# Patient Record
Sex: FEMALE | Race: WHITE | NOT HISPANIC OR LATINO | URBAN - METROPOLITAN AREA
[De-identification: names, ages, dates, MRNs, and addresses within clinical notes are randomized per-mention and may not be internally consistent; named-entity substitution may affect disease eponyms.]

---

## 2017-03-09 ENCOUNTER — OUTPATIENT (OUTPATIENT)
Dept: OUTPATIENT SERVICES | Facility: HOSPITAL | Age: 57
LOS: 1 days | Discharge: HOME | End: 2017-03-09

## 2017-03-09 ENCOUNTER — APPOINTMENT (OUTPATIENT)
Dept: HEMATOLOGY ONCOLOGY | Facility: CLINIC | Age: 57
End: 2017-03-09

## 2017-03-09 VITALS
DIASTOLIC BLOOD PRESSURE: 80 MMHG | HEIGHT: 60 IN | HEART RATE: 68 BPM | TEMPERATURE: 99.7 F | RESPIRATION RATE: 14 BRPM | BODY MASS INDEX: 35.34 KG/M2 | WEIGHT: 180 LBS | SYSTOLIC BLOOD PRESSURE: 169 MMHG

## 2017-06-06 ENCOUNTER — RX RENEWAL (OUTPATIENT)
Age: 57
End: 2017-06-06

## 2017-06-12 ENCOUNTER — APPOINTMENT (OUTPATIENT)
Dept: BREAST CENTER | Facility: CLINIC | Age: 57
End: 2017-06-12

## 2017-06-12 VITALS
SYSTOLIC BLOOD PRESSURE: 122 MMHG | WEIGHT: 178 LBS | HEIGHT: 60 IN | BODY MASS INDEX: 34.95 KG/M2 | DIASTOLIC BLOOD PRESSURE: 80 MMHG

## 2017-06-27 DIAGNOSIS — C50.411 MALIGNANT NEOPLASM OF UPPER-OUTER QUADRANT OF RIGHT FEMALE BREAST: ICD-10-CM

## 2017-09-21 ENCOUNTER — OUTPATIENT (OUTPATIENT)
Dept: OUTPATIENT SERVICES | Facility: HOSPITAL | Age: 57
LOS: 1 days | Discharge: HOME | End: 2017-09-21

## 2017-09-21 ENCOUNTER — APPOINTMENT (OUTPATIENT)
Dept: HEMATOLOGY ONCOLOGY | Facility: CLINIC | Age: 57
End: 2017-09-21

## 2017-09-21 VITALS
BODY MASS INDEX: 33.99 KG/M2 | WEIGHT: 180 LBS | DIASTOLIC BLOOD PRESSURE: 86 MMHG | TEMPERATURE: 99.7 F | HEIGHT: 61 IN | HEART RATE: 76 BPM | SYSTOLIC BLOOD PRESSURE: 175 MMHG | RESPIRATION RATE: 14 BRPM

## 2017-09-22 DIAGNOSIS — N83.202 UNSPECIFIED OVARIAN CYST, LEFT SIDE: ICD-10-CM

## 2017-09-22 DIAGNOSIS — C50.411 MALIGNANT NEOPLASM OF UPPER-OUTER QUADRANT OF RIGHT FEMALE BREAST: ICD-10-CM

## 2017-09-22 DIAGNOSIS — N83.201 UNSPECIFIED OVARIAN CYST, RIGHT SIDE: ICD-10-CM

## 2017-12-11 ENCOUNTER — APPOINTMENT (OUTPATIENT)
Dept: BREAST CENTER | Facility: CLINIC | Age: 57
End: 2017-12-11

## 2018-02-20 ENCOUNTER — APPOINTMENT (OUTPATIENT)
Dept: BREAST CENTER | Facility: CLINIC | Age: 58
End: 2018-02-20
Payer: COMMERCIAL

## 2018-03-01 ENCOUNTER — APPOINTMENT (OUTPATIENT)
Dept: BREAST CENTER | Facility: CLINIC | Age: 58
End: 2018-03-01
Payer: COMMERCIAL

## 2018-04-02 ENCOUNTER — APPOINTMENT (OUTPATIENT)
Dept: HEMATOLOGY ONCOLOGY | Facility: CLINIC | Age: 58
End: 2018-04-02

## 2018-04-02 ENCOUNTER — OUTPATIENT (OUTPATIENT)
Dept: OUTPATIENT SERVICES | Facility: HOSPITAL | Age: 58
LOS: 1 days | Discharge: HOME | End: 2018-04-02

## 2018-04-02 VITALS
HEIGHT: 61 IN | HEART RATE: 94 BPM | WEIGHT: 185 LBS | RESPIRATION RATE: 14 BRPM | DIASTOLIC BLOOD PRESSURE: 80 MMHG | TEMPERATURE: 98.7 F | BODY MASS INDEX: 34.93 KG/M2 | SYSTOLIC BLOOD PRESSURE: 167 MMHG

## 2018-04-02 DIAGNOSIS — N83.9 NONINFLAMMATORY DISORDER OF OVARY, FALLOPIAN TUBE AND BROAD LIGAMENT, UNSPECIFIED: ICD-10-CM

## 2018-04-03 PROBLEM — N83.9 OVARIAN MASS: Status: ACTIVE | Noted: 2018-04-03

## 2018-04-04 DIAGNOSIS — C50.411 MALIGNANT NEOPLASM OF UPPER-OUTER QUADRANT OF RIGHT FEMALE BREAST: ICD-10-CM

## 2018-04-05 ENCOUNTER — APPOINTMENT (OUTPATIENT)
Dept: BREAST CENTER | Facility: CLINIC | Age: 58
End: 2018-04-05
Payer: COMMERCIAL

## 2018-04-05 VITALS
HEIGHT: 61 IN | BODY MASS INDEX: 34.93 KG/M2 | WEIGHT: 185 LBS | DIASTOLIC BLOOD PRESSURE: 98 MMHG | OXYGEN SATURATION: 98 % | HEART RATE: 78 BPM | SYSTOLIC BLOOD PRESSURE: 142 MMHG

## 2018-04-05 PROCEDURE — 99213 OFFICE O/P EST LOW 20 MIN: CPT

## 2018-05-04 ENCOUNTER — MOBILE ON CALL (OUTPATIENT)
Age: 58
End: 2018-05-04

## 2018-06-01 ENCOUNTER — OUTPATIENT (OUTPATIENT)
Dept: OUTPATIENT SERVICES | Facility: HOSPITAL | Age: 58
LOS: 1 days | Discharge: HOME | End: 2018-06-01

## 2018-06-01 ENCOUNTER — APPOINTMENT (OUTPATIENT)
Dept: GYNECOLOGIC ONCOLOGY | Facility: CLINIC | Age: 58
End: 2018-06-01

## 2018-06-01 VITALS
BODY MASS INDEX: 35.5 KG/M2 | DIASTOLIC BLOOD PRESSURE: 91 MMHG | SYSTOLIC BLOOD PRESSURE: 151 MMHG | TEMPERATURE: 97.6 F | RESPIRATION RATE: 14 BRPM | HEIGHT: 61 IN | WEIGHT: 188 LBS | HEART RATE: 80 BPM

## 2018-06-07 ENCOUNTER — OUTPATIENT (OUTPATIENT)
Dept: OUTPATIENT SERVICES | Facility: HOSPITAL | Age: 58
LOS: 1 days | Discharge: HOME | End: 2018-06-07

## 2018-06-07 VITALS
RESPIRATION RATE: 17 BRPM | SYSTOLIC BLOOD PRESSURE: 149 MMHG | OXYGEN SATURATION: 100 % | DIASTOLIC BLOOD PRESSURE: 75 MMHG | WEIGHT: 185.19 LBS | TEMPERATURE: 97 F | HEART RATE: 80 BPM

## 2018-06-07 DIAGNOSIS — Z01.818 ENCOUNTER FOR OTHER PREPROCEDURAL EXAMINATION: ICD-10-CM

## 2018-06-07 DIAGNOSIS — Z98.890 OTHER SPECIFIED POSTPROCEDURAL STATES: Chronic | ICD-10-CM

## 2018-06-07 LAB
ALBUMIN SERPL ELPH-MCNC: 4.4 G/DL — SIGNIFICANT CHANGE UP (ref 3.5–5.2)
ALP SERPL-CCNC: 73 U/L — SIGNIFICANT CHANGE UP (ref 30–115)
ALT FLD-CCNC: 20 U/L — SIGNIFICANT CHANGE UP (ref 0–41)
ANION GAP SERPL CALC-SCNC: 15 MMOL/L — HIGH (ref 7–14)
APPEARANCE UR: (no result)
APTT BLD: 34.3 SEC — SIGNIFICANT CHANGE UP (ref 27–39.2)
AST SERPL-CCNC: 19 U/L — SIGNIFICANT CHANGE UP (ref 0–41)
BACTERIA # UR AUTO: (no result) /HPF
BASOPHILS # BLD AUTO: 0.05 K/UL — SIGNIFICANT CHANGE UP (ref 0–0.2)
BASOPHILS NFR BLD AUTO: 0.6 % — SIGNIFICANT CHANGE UP (ref 0–1)
BILIRUB SERPL-MCNC: <0.2 MG/DL — SIGNIFICANT CHANGE UP (ref 0.2–1.2)
BILIRUB UR-MCNC: NEGATIVE — SIGNIFICANT CHANGE UP
BLD GP AB SCN SERPL QL: SIGNIFICANT CHANGE UP
BUN SERPL-MCNC: 20 MG/DL — SIGNIFICANT CHANGE UP (ref 10–20)
CALCIUM SERPL-MCNC: 9.8 MG/DL — SIGNIFICANT CHANGE UP (ref 8.5–10.1)
CHLORIDE SERPL-SCNC: 102 MMOL/L — SIGNIFICANT CHANGE UP (ref 98–110)
CO2 SERPL-SCNC: 26 MMOL/L — SIGNIFICANT CHANGE UP (ref 17–32)
COLOR SPEC: YELLOW — SIGNIFICANT CHANGE UP
CREAT SERPL-MCNC: 1 MG/DL — SIGNIFICANT CHANGE UP (ref 0.7–1.5)
DIFF PNL FLD: NEGATIVE — SIGNIFICANT CHANGE UP
EOSINOPHIL # BLD AUTO: 0.17 K/UL — SIGNIFICANT CHANGE UP (ref 0–0.7)
EOSINOPHIL NFR BLD AUTO: 2.2 % — SIGNIFICANT CHANGE UP (ref 0–8)
GLUCOSE SERPL-MCNC: 109 MG/DL — HIGH (ref 70–99)
GLUCOSE UR QL: NEGATIVE MG/DL — SIGNIFICANT CHANGE UP
HCT VFR BLD CALC: 43.2 % — SIGNIFICANT CHANGE UP (ref 37–47)
HGB BLD-MCNC: 14 G/DL — SIGNIFICANT CHANGE UP (ref 12–16)
IMM GRANULOCYTES NFR BLD AUTO: 0.1 % — SIGNIFICANT CHANGE UP (ref 0.1–0.3)
INR BLD: 0.98 RATIO — SIGNIFICANT CHANGE UP (ref 0.65–1.3)
KETONES UR-MCNC: NEGATIVE — SIGNIFICANT CHANGE UP
LEUKOCYTE ESTERASE UR-ACNC: NEGATIVE — SIGNIFICANT CHANGE UP
LYMPHOCYTES # BLD AUTO: 1.55 K/UL — SIGNIFICANT CHANGE UP (ref 1.2–3.4)
LYMPHOCYTES # BLD AUTO: 19.8 % — LOW (ref 20.5–51.1)
MCHC RBC-ENTMCNC: 28.6 PG — SIGNIFICANT CHANGE UP (ref 27–31)
MCHC RBC-ENTMCNC: 32.4 G/DL — SIGNIFICANT CHANGE UP (ref 32–37)
MCV RBC AUTO: 88.3 FL — SIGNIFICANT CHANGE UP (ref 81–99)
MONOCYTES # BLD AUTO: 0.6 K/UL — SIGNIFICANT CHANGE UP (ref 0.1–0.6)
MONOCYTES NFR BLD AUTO: 7.7 % — SIGNIFICANT CHANGE UP (ref 1.7–9.3)
NEUTROPHILS # BLD AUTO: 5.44 K/UL — SIGNIFICANT CHANGE UP (ref 1.4–6.5)
NEUTROPHILS NFR BLD AUTO: 69.6 % — SIGNIFICANT CHANGE UP (ref 42.2–75.2)
NITRITE UR-MCNC: NEGATIVE — SIGNIFICANT CHANGE UP
NRBC # BLD: 0 /100 WBCS — SIGNIFICANT CHANGE UP (ref 0–0)
PH UR: 6 — SIGNIFICANT CHANGE UP (ref 5–8)
PLATELET # BLD AUTO: 253 K/UL — SIGNIFICANT CHANGE UP (ref 130–400)
POTASSIUM SERPL-MCNC: 4.4 MMOL/L — SIGNIFICANT CHANGE UP (ref 3.5–5)
POTASSIUM SERPL-SCNC: 4.4 MMOL/L — SIGNIFICANT CHANGE UP (ref 3.5–5)
PROT SERPL-MCNC: 7.3 G/DL — SIGNIFICANT CHANGE UP (ref 6–8)
PROT UR-MCNC: NEGATIVE MG/DL — SIGNIFICANT CHANGE UP
PROTHROM AB SERPL-ACNC: 10.6 SEC — SIGNIFICANT CHANGE UP (ref 9.95–12.87)
RBC # BLD: 4.89 M/UL — SIGNIFICANT CHANGE UP (ref 4.2–5.4)
RBC # FLD: 13.2 % — SIGNIFICANT CHANGE UP (ref 11.5–14.5)
SODIUM SERPL-SCNC: 143 MMOL/L — SIGNIFICANT CHANGE UP (ref 135–146)
SP GR SPEC: 1.02 — SIGNIFICANT CHANGE UP (ref 1.01–1.03)
TYPE + AB SCN PNL BLD: SIGNIFICANT CHANGE UP
UROBILINOGEN FLD QL: 0.2 MG/DL — SIGNIFICANT CHANGE UP (ref 0.2–0.2)
WBC # BLD: 7.82 K/UL — SIGNIFICANT CHANGE UP (ref 4.8–10.8)
WBC # FLD AUTO: 7.82 K/UL — SIGNIFICANT CHANGE UP (ref 4.8–10.8)
WBC UR QL: SIGNIFICANT CHANGE UP /HPF

## 2018-06-07 NOTE — H&P PST ADULT - HISTORY OF PRESENT ILLNESS
PATIENT DENIES CHEST PAIN, SHORTNESS OF BREATH, PALPITATIONS, COUGHING, FEVER, DYSURIA.  CAN WALK UP 3-4 FLIGHTS OF STEPS WITHOUT SOB.

## 2018-06-07 NOTE — H&P PST ADULT - REASON FOR ADMISSION
58 Y/O FEMALE HERE FOR PRE-ADMISSION SURGICAL TESTING. PATIENT REPORTS SHE HAS A H/O RIGHT BREAST CANCER 11/2014. PATIENT HAS A LONG H/O OVARIAN CYSTS. PRIOR W/U REVEALED THAT THE OVARIAN CYSTS ARE GETTING PROGRESSIVELY BIGGER. TUMOR MARKERS WERE NEGATIVE. NOW FOR SCHEDULED PROCEDURE FOR PROPHYLACTIC REASONS.

## 2018-06-07 NOTE — H&P PST ADULT - PMH
Anxiety    Breast cancer  RIGHT BREAST CA, S/P LUMPECTOMY AND RADIATION. LAST DOSE 3/15  Ovarian cyst  B/L

## 2018-06-08 DIAGNOSIS — C54.1 MALIGNANT NEOPLASM OF ENDOMETRIUM: ICD-10-CM

## 2018-06-15 ENCOUNTER — RESULT REVIEW (OUTPATIENT)
Age: 58
End: 2018-06-15

## 2018-06-15 ENCOUNTER — OUTPATIENT (OUTPATIENT)
Dept: OUTPATIENT SERVICES | Facility: HOSPITAL | Age: 58
LOS: 1 days | Discharge: HOME | End: 2018-06-15

## 2018-06-15 VITALS
HEART RATE: 69 BPM | DIASTOLIC BLOOD PRESSURE: 87 MMHG | TEMPERATURE: 98 F | RESPIRATION RATE: 18 BRPM | SYSTOLIC BLOOD PRESSURE: 166 MMHG | WEIGHT: 188.05 LBS | HEIGHT: 61 IN

## 2018-06-15 VITALS — HEART RATE: 86 BPM | RESPIRATION RATE: 20 BRPM | DIASTOLIC BLOOD PRESSURE: 71 MMHG | SYSTOLIC BLOOD PRESSURE: 117 MMHG

## 2018-06-15 DIAGNOSIS — Z98.890 OTHER SPECIFIED POSTPROCEDURAL STATES: Chronic | ICD-10-CM

## 2018-06-15 LAB — ABO RH CONFIRMATION: SIGNIFICANT CHANGE UP

## 2018-06-15 RX ORDER — MEPERIDINE HYDROCHLORIDE 50 MG/ML
12.5 INJECTION INTRAMUSCULAR; INTRAVENOUS; SUBCUTANEOUS
Qty: 0 | Refills: 0 | Status: DISCONTINUED | OUTPATIENT
Start: 2018-06-15 | End: 2018-06-15

## 2018-06-15 RX ORDER — HYDROMORPHONE HYDROCHLORIDE 2 MG/ML
0.5 INJECTION INTRAMUSCULAR; INTRAVENOUS; SUBCUTANEOUS
Qty: 0 | Refills: 0 | Status: DISCONTINUED | OUTPATIENT
Start: 2018-06-15 | End: 2018-06-15

## 2018-06-15 RX ORDER — IBUPROFEN 200 MG
1 TABLET ORAL
Qty: 120 | Refills: 1
Start: 2018-06-15 | End: 2018-08-13

## 2018-06-15 RX ORDER — OXYCODONE AND ACETAMINOPHEN 5; 325 MG/1; MG/1
1 TABLET ORAL
Qty: 20 | Refills: 0
Start: 2018-06-15

## 2018-06-15 RX ORDER — ONDANSETRON 8 MG/1
4 TABLET, FILM COATED ORAL ONCE
Qty: 0 | Refills: 0 | Status: DISCONTINUED | OUTPATIENT
Start: 2018-06-15 | End: 2018-06-15

## 2018-06-15 RX ORDER — HEPARIN SODIUM 5000 [USP'U]/ML
5000 INJECTION INTRAVENOUS; SUBCUTANEOUS ONCE
Qty: 0 | Refills: 0 | Status: DISCONTINUED | OUTPATIENT
Start: 2018-06-15 | End: 2018-06-15

## 2018-06-15 RX ORDER — KETOROLAC TROMETHAMINE 30 MG/ML
15 SYRINGE (ML) INJECTION EVERY 6 HOURS
Qty: 0 | Refills: 0 | Status: DISCONTINUED | OUTPATIENT
Start: 2018-06-15 | End: 2018-06-15

## 2018-06-15 RX ORDER — SODIUM CHLORIDE 9 MG/ML
1000 INJECTION, SOLUTION INTRAVENOUS
Qty: 0 | Refills: 0 | Status: DISCONTINUED | OUTPATIENT
Start: 2018-06-15 | End: 2018-06-15

## 2018-06-15 RX ORDER — HYDROMORPHONE HYDROCHLORIDE 2 MG/ML
1 INJECTION INTRAMUSCULAR; INTRAVENOUS; SUBCUTANEOUS
Qty: 0 | Refills: 0 | Status: DISCONTINUED | OUTPATIENT
Start: 2018-06-15 | End: 2018-06-15

## 2018-06-15 RX ADMIN — SODIUM CHLORIDE 100 MILLILITER(S): 9 INJECTION, SOLUTION INTRAVENOUS at 14:47

## 2018-06-15 RX ADMIN — Medication 15 MILLIGRAM(S): at 14:47

## 2018-06-15 RX ADMIN — Medication 15 MILLIGRAM(S): at 14:07

## 2018-06-15 NOTE — ASU DISCHARGE PLAN (ADULT/PEDIATRIC). - NOTIFY
GYN Fever>100.4/Inability to Tolerate Liquids or Foods/Unable to Urinate/Pain not relieved by Medications/Increased Irritability or Sluggishness/Persistent Nausea and Vomiting

## 2018-06-15 NOTE — CHART NOTE - NSCHARTNOTEFT_GEN_A_CORE
PACU ANESTHESIA ADMISSION NOTE      Procedure: ovarian cyst  Post op diagnosis:  robotic assisted laproscopic lysis of adhesions,bilateral salphingo oopherectomy    ____  Intubated  TV:______       Rate: ______      FiO2: ______    _x___  Patent Airway    _x___  Full return of protective reflexes    _x___  Full recovery from anesthesia / back to baseline status    Vitals  SPO2:-97% on 3l  HR:-92  RR:-14  B.P:-130/77  TEMp:-98.8    Mental Status:  _x___ Awake   ___x_ Alert   _____ Drowsy   _____ Sedated    Nausea/Vomiting:  _x___  NO       ______Yes,   See Post - Op Orders         Pain Scale (0-10):  __0___    Treatment: _x___ None    __x__ See Post - Op/PCA Orders    Post - Operative Fluids:   ___ Oral   ____x See Post - Op Orders    Plan: Discharge:   __x__Home       ___Floor     _____Critical Care    _____  Other:_________________    Comments:  Report endorsed to RN in pacu  Vitals stable  No anesthesia issues or complications noted.  Discharge to patient to home when criteria met.

## 2018-06-15 NOTE — BRIEF OPERATIVE NOTE - OPERATION/FINDINGS
EUA: narrow introitus, stenotic cervix, otherwise normal vagina and cervix, normal sized uterus  Lap: dense and filmy adhesions between the bowel and uterus and ovaries, lysis of adhesions performed, left cystic ovary suggestive of endometrioma, right cystic ovary with endometrioma, normal tubes and fimbria; normal uterus. Adhesions between the posterior cul de sac and uterus

## 2018-06-15 NOTE — BRIEF OPERATIVE NOTE - PROCEDURE
<<-----Click on this checkbox to enter Procedure Lysis of adhesions  06/15/2018    Active  JANAYU  Laparoscopic robotic assisted procedure  06/15/2018    Active  JANAYU  Bilateral salpingectomy with oophorectomy  06/15/2018    Active  JANAYU

## 2018-06-15 NOTE — BRIEF OPERATIVE NOTE - POST-OP DX
Endometrioma of ovary  06/15/2018    Active  Ryan Cobb  Ovarian cyst  06/15/2018    Active  Ryan Cobb

## 2018-06-18 LAB — NON-GYNECOLOGICAL CYTOLOGY STUDY: SIGNIFICANT CHANGE UP

## 2018-06-19 LAB — SURGICAL PATHOLOGY STUDY: SIGNIFICANT CHANGE UP

## 2018-06-20 DIAGNOSIS — Z85.3 PERSONAL HISTORY OF MALIGNANT NEOPLASM OF BREAST: ICD-10-CM

## 2018-06-20 DIAGNOSIS — N73.6 FEMALE PELVIC PERITONEAL ADHESIONS (POSTINFECTIVE): ICD-10-CM

## 2018-06-20 DIAGNOSIS — Z88.0 ALLERGY STATUS TO PENICILLIN: ICD-10-CM

## 2018-06-20 DIAGNOSIS — N80.1 ENDOMETRIOSIS OF OVARY: ICD-10-CM

## 2018-06-20 DIAGNOSIS — N83.291 OTHER OVARIAN CYST, RIGHT SIDE: ICD-10-CM

## 2018-06-20 DIAGNOSIS — F41.9 ANXIETY DISORDER, UNSPECIFIED: ICD-10-CM

## 2018-07-06 ENCOUNTER — APPOINTMENT (OUTPATIENT)
Dept: GYNECOLOGIC ONCOLOGY | Facility: CLINIC | Age: 58
End: 2018-07-06

## 2018-07-06 ENCOUNTER — OUTPATIENT (OUTPATIENT)
Dept: OUTPATIENT SERVICES | Facility: HOSPITAL | Age: 58
LOS: 1 days | Discharge: HOME | End: 2018-07-06

## 2018-07-06 VITALS
RESPIRATION RATE: 14 BRPM | BODY MASS INDEX: 35.5 KG/M2 | WEIGHT: 188 LBS | SYSTOLIC BLOOD PRESSURE: 150 MMHG | HEART RATE: 69 BPM | DIASTOLIC BLOOD PRESSURE: 80 MMHG | HEIGHT: 61 IN

## 2018-07-06 DIAGNOSIS — Z98.890 OTHER SPECIFIED POSTPROCEDURAL STATES: Chronic | ICD-10-CM

## 2018-09-27 ENCOUNTER — OUTPATIENT (OUTPATIENT)
Dept: OUTPATIENT SERVICES | Facility: HOSPITAL | Age: 58
LOS: 1 days | Discharge: HOME | End: 2018-09-27

## 2018-09-27 ENCOUNTER — APPOINTMENT (OUTPATIENT)
Dept: HEMATOLOGY ONCOLOGY | Facility: CLINIC | Age: 58
End: 2018-09-27

## 2018-09-27 VITALS
BODY MASS INDEX: 31.34 KG/M2 | DIASTOLIC BLOOD PRESSURE: 71 MMHG | SYSTOLIC BLOOD PRESSURE: 158 MMHG | TEMPERATURE: 99.6 F | RESPIRATION RATE: 16 BRPM | HEIGHT: 61 IN | WEIGHT: 166 LBS | HEART RATE: 61 BPM

## 2018-09-27 DIAGNOSIS — Z98.890 OTHER SPECIFIED POSTPROCEDURAL STATES: Chronic | ICD-10-CM

## 2018-09-27 PROBLEM — N83.209 UNSPECIFIED OVARIAN CYST, UNSPECIFIED SIDE: Chronic | Status: ACTIVE | Noted: 2018-06-07

## 2018-09-27 PROBLEM — C50.919 MALIGNANT NEOPLASM OF UNSPECIFIED SITE OF UNSPECIFIED FEMALE BREAST: Chronic | Status: ACTIVE | Noted: 2018-06-07

## 2018-09-27 PROBLEM — F41.9 ANXIETY DISORDER, UNSPECIFIED: Chronic | Status: ACTIVE | Noted: 2018-06-07

## 2018-09-28 DIAGNOSIS — D27.1 BENIGN NEOPLASM OF LEFT OVARY: ICD-10-CM

## 2018-09-28 DIAGNOSIS — C50.411 MALIGNANT NEOPLASM OF UPPER-OUTER QUADRANT OF RIGHT FEMALE BREAST: ICD-10-CM

## 2018-09-28 DIAGNOSIS — D27.0 BENIGN NEOPLASM OF RIGHT OVARY: ICD-10-CM

## 2018-12-06 ENCOUNTER — APPOINTMENT (OUTPATIENT)
Dept: BREAST CENTER | Facility: CLINIC | Age: 58
End: 2018-12-06

## 2019-01-31 ENCOUNTER — APPOINTMENT (OUTPATIENT)
Dept: BREAST CENTER | Facility: CLINIC | Age: 59
End: 2019-01-31

## 2019-03-28 ENCOUNTER — OUTPATIENT (OUTPATIENT)
Dept: OUTPATIENT SERVICES | Facility: HOSPITAL | Age: 59
LOS: 1 days | Discharge: HOME | End: 2019-03-28

## 2019-03-28 ENCOUNTER — APPOINTMENT (OUTPATIENT)
Dept: HEMATOLOGY ONCOLOGY | Facility: CLINIC | Age: 59
End: 2019-03-28

## 2019-03-28 VITALS
RESPIRATION RATE: 16 BRPM | WEIGHT: 139 LBS | TEMPERATURE: 98.2 F | BODY MASS INDEX: 26.24 KG/M2 | HEART RATE: 78 BPM | HEIGHT: 61 IN | SYSTOLIC BLOOD PRESSURE: 133 MMHG | DIASTOLIC BLOOD PRESSURE: 83 MMHG

## 2019-03-28 DIAGNOSIS — Z98.890 OTHER SPECIFIED POSTPROCEDURAL STATES: Chronic | ICD-10-CM

## 2019-03-29 DIAGNOSIS — C50.411 MALIGNANT NEOPLASM OF UPPER-OUTER QUADRANT OF RIGHT FEMALE BREAST: ICD-10-CM

## 2019-04-07 NOTE — ASSESSMENT
[FreeTextEntry1] : 56 year old female with history of stage IA hormone receptor positive, HER2 negative infiltrating ductal carcinoma of the right breast, status post lumpectomy and radiation, currently on Arimidex without any evidence of recurrence. \par Benign lesions on both ovaries\par \par Plan:\par Continue with Arimidex. Continue Ca and Vit D\par Mammogram in 11/2018 was neg.\par DEXA in 05/2017 showed osteopenia. \par \par RTC 6 months.\par \par \par

## 2019-04-07 NOTE — PHYSICAL EXAM
[Fully active, able to carry on all pre-disease performance without restriction] : Status 0 - Fully active, able to carry on all pre-disease performance without restriction [Normal] : affect appropriate [de-identified] : Right breast shows lumpectomy scars without any palpable abnormalities. Left breast is normal

## 2019-04-07 NOTE — HISTORY OF PRESENT ILLNESS
[de-identified] : The patient returns for a followup visit.  She has history of stage IA hormone receptor positive, HER2 negative infiltrating ductal carcinoma of the right breast, status post lumpectomy and radiation.  Oncotype DX score on the tumor was 15.  She was started on hormonal therapy with Arimidex in 03/2015.  She is tolerating it well.  She is very compliant with her medications.  She does not report any new symptoms.  She follow up with her gynecologist annually. Her mammogram from November 2016 was unremarkable. She also had a bilateral sonogram which was unremarkable also. Her last Dexa scan was in May 2017 and showed osteopenia. She takes calcium and vitamin D regularly.\par \par She is not interested in getting colonoscopy.\par  [de-identified] : AURELIANO DANIELS                         2    Surgical Final Report     Final Diagnosis 1. Left fallopian tube and ovary for frozen section: - Hemorrhagic cyst of ovary with mainly denuded inner surface lining and abundant hemosiderin laden macrophages, consistent with endometrioid cyst. -The remaining ovarian parenchyma showing corpus albicans and a few glandular inclusions. - Segment of fallopian tube without significant pathologic changes.  \par \par 2. Right fallopian tube and ovary for frozen section: - Hemorrhagic cyst of ovary with mainly denuded inner surface lining and abundant hemosiderin laden macrophages, consistent with endometrioid cyst. - The remaining ovarian parenchyma showing corpus albicans and a few glandular inclusions with microcalcification. -Adjacent benign simple cysts also present. - Segment of fallopian tube without significant pathological change. [de-identified] : No new complaints. Had labs on 8/7/17 which were reviewed and were normal.\par She had pelvic USG findings showed decrease in size of right ovarian cyst and decrease in size of complex left mass. Report has been reviewed with patient in detail. She denies any vaginal bleeding and has been seen by her GYN on a regular basis.\par She is compliant with her meds\par \par 4/2/18\par Patient here for follow up, feels well has no complaints, denies any shortness of breath, chest pain, abdominal pain, nausea, vomiting, vaginal bleeding, or change in bowel or urinary habits. Patient had a 3D mammogram and US done 11/16/17 which were BIRADS-1. Patient also had a follow up US which demonstrated a mild increase in the bilobed Mild right ovarian cystic lesion\par \par 9/27/18:\par S/p b/l oophorectomy in June 2018. \par On Arimidex since 03/2015.\par Tolerating well. Denies fever, nausea, vomiting, chest pain, SOB, abdominal pain, bowel and bladder problems.\par Due for mammogram in 11/2018. \par On calcium and Vit D supplementation. DEXA in 05/2017 showed osteopenia. \par \par 3/28/19\par pt is here for follow up.\par No new complaints\par On Arimidex since 03/2015.\par Tolerating well. Denies fever, nausea, vomiting, chest pain, SOB, abdominal pain, bowel and bladder problems.

## 2019-09-23 ENCOUNTER — OUTPATIENT (OUTPATIENT)
Dept: OUTPATIENT SERVICES | Facility: HOSPITAL | Age: 59
LOS: 1 days | Discharge: HOME | End: 2019-09-23

## 2019-09-23 ENCOUNTER — APPOINTMENT (OUTPATIENT)
Dept: HEMATOLOGY ONCOLOGY | Facility: CLINIC | Age: 59
End: 2019-09-23
Payer: COMMERCIAL

## 2019-09-23 VITALS
TEMPERATURE: 96.3 F | HEART RATE: 72 BPM | BODY MASS INDEX: 24.92 KG/M2 | HEIGHT: 61 IN | WEIGHT: 132 LBS | SYSTOLIC BLOOD PRESSURE: 158 MMHG | DIASTOLIC BLOOD PRESSURE: 89 MMHG

## 2019-09-23 DIAGNOSIS — Z98.890 OTHER SPECIFIED POSTPROCEDURAL STATES: Chronic | ICD-10-CM

## 2019-09-23 DIAGNOSIS — Z79.811 LONG TERM (CURRENT) USE OF AROMATASE INHIBITORS: ICD-10-CM

## 2019-09-23 PROCEDURE — 99214 OFFICE O/P EST MOD 30 MIN: CPT

## 2019-09-23 NOTE — ASSESSMENT
[FreeTextEntry1] : 56 year old female with history of stage IA hormone receptor positive, HER2 negative infiltrating ductal carcinoma of the right breast, status post lumpectomy and radiation, currently on Arimidex without any evidence of recurrence.  Benign lesions on both ovaries.\par \par Plan:\par - 7/30/2019 labs from primary care physician reviewed.\par - Continue with Arimidex. Patient will complete 5 years in 3/2020.  Continue Calcium and Vitamin D supplementation.\par - The side effects from such treatment were discussed in detail, including but not limited to hot flashes,weight gain, hair thinning, arthralgia, myalgia, bone loss, increased risk of osteoporotic fractures and thromboembolism.\par She will take Ca + VIt D daily while on AI.  Her compliance and any side effects from such treatment were assessed at today's visit.\par - Mammogram in 11/2018 was negative.  Patient has an upcoming appointment for a repeat mammogram in 11/2019.\par - DEXA in 5/2019 showed osteopenia in lumbar spine and femoral neck.  Patient was offered Boniva but declined.  States that she walks 2 miles daily and takes calcium and vitamin D.  Patient counseled to add weight-bearing exercise.\par - Patient is UTD with gynecologic care but not UTD with colonoscopy.  Educated on importance of colonoscopy.\par \par Follow up in 6 months.\par \par Patient was seen and examined with Dr. Byers, who agreed with the above plan of care.\par \par

## 2019-09-23 NOTE — RESULTS/DATA
[FreeTextEntry1] : DEXA scan 11/2019: Osteopenia of L spine and femoral neck\par \par Mammogram 11/2018: BI-RADS 1.

## 2019-09-23 NOTE — CONSULT LETTER
[Dear  ___] : Dear  [unfilled], [Courtesy Letter:] : I had the pleasure of seeing your patient, [unfilled], in my office today. [Please see my note below.] : Please see my note below. [Sincerely,] : Sincerely, [FreeTextEntry3] : RAZA Byers

## 2019-09-23 NOTE — HISTORY OF PRESENT ILLNESS
[de-identified] : The patient returns for a followup visit.  She has history of stage IA hormone receptor positive, HER2 negative infiltrating ductal carcinoma of the right breast, status post lumpectomy and radiation.  Oncotype DX score on the tumor was 15.  She was started on hormonal therapy with Arimidex in 03/2015.  She is tolerating it well.  She is very compliant with her medications.  She does not report any new symptoms.  She follow up with her gynecologist annually. Her mammogram from November 2016 was unremarkable. She also had a bilateral sonogram which was unremarkable also. Her last Dexa scan was in May 2017 and showed osteopenia. She takes calcium and vitamin D regularly.\par \par She is not interested in getting colonoscopy.\par  [de-identified] : AURELIANO DANIELS                         2    Surgical Final Report     Final Diagnosis 1. Left fallopian tube and ovary for frozen section: - Hemorrhagic cyst of ovary with mainly denuded inner surface lining and abundant hemosiderin laden macrophages, consistent with endometrioid cyst. -The remaining ovarian parenchyma showing corpus albicans and a few glandular inclusions. - Segment of fallopian tube without significant pathologic changes.  \par \par 2. Right fallopian tube and ovary for frozen section: - Hemorrhagic cyst of ovary with mainly denuded inner surface lining and abundant hemosiderin laden macrophages, consistent with endometrioid cyst. - The remaining ovarian parenchyma showing corpus albicans and a few glandular inclusions with microcalcification. -Adjacent benign simple cysts also present. - Segment of fallopian tube without significant pathological change. [de-identified] : No new complaints. Had labs on 8/7/17 which were reviewed and were normal.\par She had pelvic USG findings showed decrease in size of right ovarian cyst and decrease in size of complex left mass. Report has been reviewed with patient in detail. She denies any vaginal bleeding and has been seen by her GYN on a regular basis.\par She is compliant with her meds\par \par 4/2/18\par Patient here for follow up, feels well has no complaints, denies any shortness of breath, chest pain, abdominal pain, nausea, vomiting, vaginal bleeding, or change in bowel or urinary habits. Patient had a 3D mammogram and US done 11/16/17 which were BIRADS-1. Patient also had a follow up US which demonstrated a mild increase in the bilobed Mild right ovarian cystic lesion\par \par 9/27/18:\par S/p b/l oophorectomy in June 2018. \par On Arimidex since 03/2015.\par Tolerating well. Denies fever, nausea, vomiting, chest pain, SOB, abdominal pain, bowel and bladder problems.\par Due for mammogram in 11/2018. \par On calcium and Vit D supplementation. DEXA in 05/2017 showed osteopenia. \par \par 3/28/19\par pt is here for follow up.\par No new complaints\par On Arimidex since 03/2015.\par Tolerating well. Denies fever, nausea, vomiting, chest pain, SOB, abdominal pain, bowel and bladder problems.\par \par 9/23/2019\par Patient presents for follow up.  She has no complaints.  Was found to have osteopenia on 5/2017 and 5/2019 DEXA scans and was offered Boniva but declined.  States that she walks two miles daily and takes calcium and vitamin D supplementation, so she does not believe it is necessary.  Is compliant with arimidex and is looking forward to discontinuing it after 5 years.  Denies anorexia, weight loss, breast masses, nipple discharge, axillary LAD.  Already has appointment for mammogram in 11/2019.  Is UTD with gynecologic care but not with colonoscopy.

## 2019-09-23 NOTE — PHYSICAL EXAM
[Fully active, able to carry on all pre-disease performance without restriction] : Status 0 - Fully active, able to carry on all pre-disease performance without restriction [Normal] : normal spine exam without palpable tenderness, no kyphosis or scoliosis [de-identified] : Right breast shows lumpectomy scars without any palpable abnormalities. Left breast is normal. [de-identified] : well-appearing

## 2019-09-24 DIAGNOSIS — C50.911 MALIGNANT NEOPLASM OF UNSPECIFIED SITE OF RIGHT FEMALE BREAST: ICD-10-CM

## 2019-09-24 DIAGNOSIS — M85.80 OTHER SPECIFIED DISORDERS OF BONE DENSITY AND STRUCTURE, UNSPECIFIED SITE: ICD-10-CM

## 2019-09-24 DIAGNOSIS — Z79.811 LONG TERM (CURRENT) USE OF AROMATASE INHIBITORS: ICD-10-CM

## 2020-01-21 ENCOUNTER — APPOINTMENT (OUTPATIENT)
Dept: BREAST CENTER | Facility: CLINIC | Age: 60
End: 2020-01-21
Payer: COMMERCIAL

## 2020-01-21 VITALS — BODY MASS INDEX: 25.49 KG/M2 | WEIGHT: 135 LBS | HEIGHT: 61 IN

## 2020-01-21 PROCEDURE — 99212 OFFICE O/P EST SF 10 MIN: CPT

## 2020-04-23 ENCOUNTER — APPOINTMENT (OUTPATIENT)
Dept: HEMATOLOGY ONCOLOGY | Facility: CLINIC | Age: 60
End: 2020-04-23

## 2020-09-03 ENCOUNTER — APPOINTMENT (OUTPATIENT)
Dept: HEMATOLOGY ONCOLOGY | Facility: CLINIC | Age: 60
End: 2020-09-03
Payer: COMMERCIAL

## 2020-09-03 ENCOUNTER — OUTPATIENT (OUTPATIENT)
Dept: OUTPATIENT SERVICES | Facility: HOSPITAL | Age: 60
LOS: 1 days | Discharge: HOME | End: 2020-09-03

## 2020-09-03 DIAGNOSIS — Z98.890 OTHER SPECIFIED POSTPROCEDURAL STATES: Chronic | ICD-10-CM

## 2020-09-03 DIAGNOSIS — M85.80 OTHER SPECIFIED DISORDERS OF BONE DENSITY AND STRUCTURE, UNSPECIFIED SITE: ICD-10-CM

## 2020-09-03 PROCEDURE — 99213 OFFICE O/P EST LOW 20 MIN: CPT

## 2020-09-04 NOTE — PHYSICAL EXAM
[Fully active, able to carry on all pre-disease performance without restriction] : Status 0 - Fully active, able to carry on all pre-disease performance without restriction [Normal] : affect appropriate [de-identified] : well-appearing [de-identified] : Right breast shows lumpectomy scars without any palpable abnormalities. Left breast is normal.

## 2020-09-04 NOTE — RESULTS/DATA
[FreeTextEntry1] : DEXA scan 11/2019: Osteopenia of L spine and femoral neck\par \par Mammogram 11/2019: BI-RADS 1.

## 2020-09-04 NOTE — HISTORY OF PRESENT ILLNESS
[de-identified] : The patient returns for a followup visit.  She has history of stage IA hormone receptor positive, HER2 negative infiltrating ductal carcinoma of the right breast, status post lumpectomy and radiation.  Oncotype DX score on the tumor was 15.  She was started on hormonal therapy with Arimidex in 03/2015.  She is tolerating it well.  She is very compliant with her medications.  She does not report any new symptoms.  She follow up with her gynecologist annually. Her mammogram from November 2016 was unremarkable. She also had a bilateral sonogram which was unremarkable also. Her last Dexa scan was in May 2017 and showed osteopenia. She takes calcium and vitamin D regularly.\par \par She is not interested in getting colonoscopy.\par  [de-identified] : No new complaints. Had labs on 8/7/17 which were reviewed and were normal.\par She had pelvic USG findings showed decrease in size of right ovarian cyst and decrease in size of complex left mass. Report has been reviewed with patient in detail. She denies any vaginal bleeding and has been seen by her GYN on a regular basis.\par She is compliant with her meds\par \par 4/2/18\par Patient here for follow up, feels well has no complaints, denies any shortness of breath, chest pain, abdominal pain, nausea, vomiting, vaginal bleeding, or change in bowel or urinary habits. Patient had a 3D mammogram and US done 11/16/17 which were BIRADS-1. Patient also had a follow up US which demonstrated a mild increase in the bilobed Mild right ovarian cystic lesion\par \par 9/27/18:\par S/p b/l oophorectomy in June 2018. \par On Arimidex since 03/2015.\par Tolerating well. Denies fever, nausea, vomiting, chest pain, SOB, abdominal pain, bowel and bladder problems.\par Due for mammogram in 11/2018. \par On calcium and Vit D supplementation. DEXA in 05/2017 showed osteopenia. \par \par 3/28/19\par pt is here for follow up.\par No new complaints\par On Arimidex since 03/2015.\par Tolerating well. Denies fever, nausea, vomiting, chest pain, SOB, abdominal pain, bowel and bladder problems.\par \par 9/23/2019\par Patient presents for follow up.  She has no complaints.  Was found to have osteopenia on 5/2017 and 5/2019 DEXA scans and was offered Boniva but declined.  States that she walks two miles daily and takes calcium and vitamin D supplementation, so she does not believe it is necessary.  Is compliant with arimidex and is looking forward to discontinuing it after 5 years.  Denies anorexia, weight loss, breast masses, nipple discharge, axillary LAD.  Already has appointment for mammogram in 11/2019.  Is UTD with gynecologic care but not with colonoscopy.\par \par 09/03/2020\par AURELIANO DANIELS a 59 year F is here today for follow up for breast cancer. \par Patient denies any new palpable breast lumps or pain, denies skin changes, denies nipple discharge. Denies bleeding, bone pain. \par She completed 5 years of arimidex in 3/2020. Continues vitamin d and calcium.\par [facility], 11/2019 no evidence of malignancy.  [de-identified] : AURELIANO DANIELS                         2    Surgical Final Report     Final Diagnosis 1. Left fallopian tube and ovary for frozen section: - Hemorrhagic cyst of ovary with mainly denuded inner surface lining and abundant hemosiderin laden macrophages, consistent with endometrioid cyst. -The remaining ovarian parenchyma showing corpus albicans and a few glandular inclusions. - Segment of fallopian tube without significant pathologic changes.  \par \par 2. Right fallopian tube and ovary for frozen section: - Hemorrhagic cyst of ovary with mainly denuded inner surface lining and abundant hemosiderin laden macrophages, consistent with endometrioid cyst. - The remaining ovarian parenchyma showing corpus albicans and a few glandular inclusions with microcalcification. -Adjacent benign simple cysts also present. - Segment of fallopian tube without significant pathological change.

## 2020-09-04 NOTE — ASSESSMENT
[FreeTextEntry1] : 56 year old female with history of stage IA hormone receptor positive, HER2 negative infiltrating ductal carcinoma of the right breast, status post lumpectomy and radiation, currently on Arimidex without any evidence of recurrence. Benign lesions on both ovaries, s/p RA BSO 06/15/2018. Completed 5 years of arimidex 3/2020. \par \par \par Plan:\par - 7/30/2020 labs from primary care physician reviewed.\par - Continue Calcium and Vitamin D supplementation. \par - Mammogram in 11/2019 was negative.  Patient has an upcoming appointment for a repeat mammogram in 11/2020.\par - DEXA in 5/2019 showed osteopenia in lumbar spine and femoral neck.  Patient was offered Boniva but declined.  States that she walks 2 miles daily and takes calcium and vitamin D.  Patient counseled to add weight-bearing exercise.\par - Patient is UTD with gynecologic care but not UTD with colonoscopy, she has cologuard kit at home. Educated on importance of colonoscopy. \par \par Follow up in 1 year.\par \par Patient was seen and examined with Dr. Byers, who agreed with the above plan of care.\par \par

## 2020-09-09 DIAGNOSIS — M85.80 OTHER SPECIFIED DISORDERS OF BONE DENSITY AND STRUCTURE, UNSPECIFIED SITE: ICD-10-CM

## 2020-09-09 DIAGNOSIS — C50.911 MALIGNANT NEOPLASM OF UNSPECIFIED SITE OF RIGHT FEMALE BREAST: ICD-10-CM

## 2021-05-27 ENCOUNTER — APPOINTMENT (OUTPATIENT)
Dept: BREAST CENTER | Facility: CLINIC | Age: 61
End: 2021-05-27
Payer: COMMERCIAL

## 2021-05-27 VITALS
DIASTOLIC BLOOD PRESSURE: 86 MMHG | SYSTOLIC BLOOD PRESSURE: 148 MMHG | HEIGHT: 61 IN | TEMPERATURE: 97.4 F | BODY MASS INDEX: 25.49 KG/M2 | WEIGHT: 135 LBS

## 2021-05-27 PROCEDURE — 99212 OFFICE O/P EST SF 10 MIN: CPT

## 2021-05-27 PROCEDURE — 99072 ADDL SUPL MATRL&STAF TM PHE: CPT

## 2021-05-27 NOTE — DATA REVIEWED
[FreeTextEntry1] : Mammogram 11/23/20: No significant masses, calcifications or other findings are seen in either breast \par Mammo Bi-RADS 1 NEGATIVE \par \par Ultrasound complete bilateral 11/23/20\par No suspicious abnormalities were seen sonographically in either breast \par Ultrasound Bi-RADS 1: NEGATIVE

## 2021-05-27 NOTE — HISTORY OF PRESENT ILLNESS
[FreeTextEntry1] : CC: History of Right breast cancer, 2014. \par \par HPI:\par AURELIANO DANIELS is a 60 year year old female patient who presents for evaluation and for follow up apt.  Patient has history of right breast carcinoma treated in 2014 with lumpectomy and sentinel lymph node biopsy. Surgery was performed by  at [facility], who has since retired. After surgery, she received whole breast irradiation therapy to the right breast. Oncotype DX score was 15. She saw  of medical oncology who recommended no chemotherapy, and prescribed adjuvant ARIMIDEX. \par She completed treatment of 5 years with Arimidex on 3/20. \par \par \par Mammogram 11/23/20: No significant masses, calcifications or other findings are seen in either breast \par Mammo Bi-RADS 1 NEGATIVE \par \par Ultrasound complete bilateral 11/23/20\par No suspicious abnormalities were seen sonographically in either breast \par Ultrasound Bi-RADS 1: NEGATIVE

## 2021-05-27 NOTE — PHYSICAL EXAM
[Normocephalic] : normocephalic [Atraumatic] : atraumatic [EOMI] : extra ocular movement intact [Supple] : supple [Clear to Auscultation Bilat] : clear to auscultation bilaterally [Normal Sinus Rhythm] : normal sinus rhythm [Examined in the supine and seated position] : examined in the supine and seated position [Soft] : abdomen soft [No Edema] : no edema [No Supraclavicular Adenopathy] : no supraclavicular adenopathy [No Cervical Adenopathy] : no cervical adenopathy [No Thyromegaly] : no thyromegaly [Symmetrical] : symmetrical [No dominant masses] : no dominant masses in right breast  [No dominant masses] : no dominant masses left breast [No Nipple Retraction] : no left nipple retraction [No Nipple Discharge] : no left nipple discharge [Breast Mass Right Breast ___cm] : no masses [Breast Mass Left Breast ___cm] : no masses [No Axillary Lymphadenopathy] : no left axillary lymphadenopathy [No Swelling] : no swelling [Full ROM] : full range of motion [No Rashes] : no rashes [No Ulceration] : no ulceration [Breast Nipple Inversion] : nipples not inverted [Breast Nipple Retraction] : nipples not retracted [Breast Nipple Flattening] : nipples not flattened [Breast Nipple Fissures] : nipples not fissured [Breast Abnormal Lactation (Galactorrhea)] : no galactorrhea [Breast Abnormal Secretion Bloody Fluid] : no bloody discharge [Breast Abnormal Secretion Serous Fluid] : no serous discharge [Breast Abnormal Secretion Opalescent Fluid] : no milky discharge

## 2021-05-27 NOTE — PAST MEDICAL HISTORY
[Postmenopausal] : The patient is postmenopausal [Total Preg ___] : G[unfilled] [Premature ___] : Premature: [unfilled] [History of Hormone Replacement Treatment] : has no history of hormone replacement treatment [FreeTextEntry5] : denies [FreeTextEntry6] : denies [FreeTextEntry7] : denies [FreeTextEntry8] : denies

## 2021-05-27 NOTE — REASON FOR VISIT
[Follow-Up: _____] : a [unfilled] follow-up visit [Family Member] : family member [FreeTextEntry1] : Patient has no new complaints, she denies any new masses, skin changes or nipple discharge.

## 2021-05-27 NOTE — ASSESSMENT
[FreeTextEntry1] : 60 year old female who presents today for her annual clinical breast examination.  She has a history of right breast cancer in 2014, status post lumpectomy and SLNB with Dr. Meza. She received whole breast radiation therapy. She did not receive chemotherapy. She was on Arimidex.  She has no prior complaints related to the breasts and no prior history of breast surgery or breast biopsy.  Her family history is significant for her mother with breast cancer at 67.  \par \par Bilateral screening mammogram and ultrasound were performed in November 2020. \par Mammogram 11/23/20: No significant masses, calcifications or other findings are seen in either breast \par Mammo Bi-RADS 1 NEGATIVE \par \par Ultrasound complete bilateral 11/23/20\par No suspicious abnormalities were seen sonographically in either breast \par Ultrasound Bi-RADS 1: NEGATIVE \par \par She is here for evaluation of these findings.  At this time, these findings do not present the need for surgical intervention.  She has a benign clinical breast examination and no current complaints related to the breasts. For now, she will need her annual screening mammogram and ultrasound for November 2021, with subsequent follow up clinical breast examination. \par \par Plan screening b/l mammo/ sono in NOVEMBER 2021\par Follow up one year CBE \par \par  I spent a total of 15 minutes of face to face time with this patient, greater than 50% of which was spent in counseling and/or coordination of care.  All of her questions were appropriately answered.\par

## 2022-03-22 ENCOUNTER — APPOINTMENT (OUTPATIENT)
Dept: HEMATOLOGY ONCOLOGY | Facility: CLINIC | Age: 62
End: 2022-03-22
Payer: COMMERCIAL

## 2022-03-22 ENCOUNTER — OUTPATIENT (OUTPATIENT)
Dept: OUTPATIENT SERVICES | Facility: HOSPITAL | Age: 62
LOS: 1 days | Discharge: HOME | End: 2022-03-22

## 2022-03-22 VITALS
TEMPERATURE: 97.6 F | BODY MASS INDEX: 23.6 KG/M2 | HEIGHT: 61 IN | SYSTOLIC BLOOD PRESSURE: 144 MMHG | WEIGHT: 125 LBS | HEART RATE: 65 BPM | DIASTOLIC BLOOD PRESSURE: 75 MMHG

## 2022-03-22 DIAGNOSIS — Z98.890 OTHER SPECIFIED POSTPROCEDURAL STATES: Chronic | ICD-10-CM

## 2022-03-22 PROCEDURE — 99213 OFFICE O/P EST LOW 20 MIN: CPT

## 2022-03-22 NOTE — HISTORY OF PRESENT ILLNESS
[de-identified] : The patient returns for a followup visit.  She has history of stage IA hormone receptor positive, HER2 negative infiltrating ductal carcinoma of the right breast, status post lumpectomy and radiation.  Oncotype DX score on the tumor was 15.  She was started on hormonal therapy with Arimidex in 03/2015.  She is tolerating it well.  She is very compliant with her medications.  She does not report any new symptoms.  She follow up with her gynecologist annually. Her mammogram from November 2016 was unremarkable. She also had a bilateral sonogram which was unremarkable also. Her last Dexa scan was in May 2017 and showed osteopenia. She takes calcium and vitamin D regularly.\par \par She is not interested in getting colonoscopy.\par  [de-identified] : AURELIANO DANIELS                         2    Surgical Final Report     Final Diagnosis 1. Left fallopian tube and ovary for frozen section: - Hemorrhagic cyst of ovary with mainly denuded inner surface lining and abundant hemosiderin laden macrophages, consistent with endometrioid cyst. -The remaining ovarian parenchyma showing corpus albicans and a few glandular inclusions. - Segment of fallopian tube without significant pathologic changes.  \par \par 2. Right fallopian tube and ovary for frozen section: - Hemorrhagic cyst of ovary with mainly denuded inner surface lining and abundant hemosiderin laden macrophages, consistent with endometrioid cyst. - The remaining ovarian parenchyma showing corpus albicans and a few glandular inclusions with microcalcification. -Adjacent benign simple cysts also present. - Segment of fallopian tube without significant pathological change. [de-identified] : No new complaints. Had labs on 8/7/17 which were reviewed and were normal.\par She had pelvic USG findings showed decrease in size of right ovarian cyst and decrease in size of complex left mass. Report has been reviewed with patient in detail. She denies any vaginal bleeding and has been seen by her GYN on a regular basis.\par She is compliant with her meds\par \par 4/2/18\par Patient here for follow up, feels well has no complaints, denies any shortness of breath, chest pain, abdominal pain, nausea, vomiting, vaginal bleeding, or change in bowel or urinary habits. Patient had a 3D mammogram and US done 11/16/17 which were BIRADS-1. Patient also had a follow up US which demonstrated a mild increase in the bilobed Mild right ovarian cystic lesion\par \par 9/27/18:\par S/p b/l oophorectomy in June 2018. \par On Arimidex since 03/2015.\par Tolerating well. Denies fever, nausea, vomiting, chest pain, SOB, abdominal pain, bowel and bladder problems.\par Due for mammogram in 11/2018. \par On calcium and Vit D supplementation. DEXA in 05/2017 showed osteopenia. \par \par 3/28/19\par pt is here for follow up.\par No new complaints\par On Arimidex since 03/2015.\par Tolerating well. Denies fever, nausea, vomiting, chest pain, SOB, abdominal pain, bowel and bladder problems.\par \par 9/23/2019\par Patient presents for follow up.  She has no complaints.  Was found to have osteopenia on 5/2017 and 5/2019 DEXA scans and was offered Boniva but declined.  States that she walks two miles daily and takes calcium and vitamin D supplementation, so she does not believe it is necessary.  Is compliant with arimidex and is looking forward to discontinuing it after 5 years.  Denies anorexia, weight loss, breast masses, nipple discharge, axillary LAD.  Already has appointment for mammogram in 11/2019.  Is UTD with gynecologic care but not with colonoscopy.\par \par 09/03/2020\par AURELIANO DANIELS a 59 year F is here today for follow up for breast cancer. \par Patient denies any new palpable breast lumps or pain, denies skin changes, denies nipple discharge. Denies bleeding, bone pain. \par She completed 5 years of arimidex in 3/2020. Continues vitamin d and calcium.\par [facility] bilateral screening, 11/2019 no evidence of malignancy. \par \par 3/22/2022\par Patient is here to follow up for breast cancer. \par She feels great, denies any new palpable breast lumps or pain, denies skin changes, denies nipple discharge or bone pain. \par She takes Vitamin D and calcium and maintains a healthy lifestyle, walking 4 mils per day.\par She is UTD with PCP, gyne but not colonoscopy.

## 2022-03-22 NOTE — RESULTS/DATA
[FreeTextEntry1] : Bilateral screening mammogram and US on 11/29/21 @ [facility] was benign.\par

## 2022-03-22 NOTE — PHYSICAL EXAM
[Fully active, able to carry on all pre-disease performance without restriction] : Status 0 - Fully active, able to carry on all pre-disease performance without restriction [Normal] : affect appropriate [de-identified] : Right breast without any palpable abnormalities; left breast is normal and no lymphadenopathy noted bilaterally.

## 2022-03-22 NOTE — ASSESSMENT
[FreeTextEntry1] : Patient is a 61 year old female with history of stage IA hormone receptor positive, HER2 negative infiltrating ductal carcinoma of the right breast, status post lumpectomy and radiation, currently on Arimidex without any evidence of recurrence. Benign lesions on both ovaries, s/p RA BSO 06/15/2018. Completed 5 years of Arimidex on 3/2020. \par \par Bilateral Mammogram and US on 11/29/2021 was benign.\par \par RECOMMENDATION:\par Previous notes reviewed and all relevant radiology results discussed with Dr Byers and communicated to the patient.\par \par - Will continue surveillance monitoring.\par - Next mammogram/US is due on 11/2022.\par - Continue Calcium and Vitamin D supplementation. \par - Healthy lifestyle with diet and exercise emphasized.\par - Continue to follow up with Breast Surgeon.\par - Follow up with PCP, gyne and GI for health maintenance.\par \par Follow up in 1 year.\par \par Case was seen and discussed with Dr. Byers who agreed with assessment and plan.\par \par \par

## 2022-03-23 DIAGNOSIS — C50.919 MALIGNANT NEOPLASM OF UNSPECIFIED SITE OF UNSPECIFIED FEMALE BREAST: ICD-10-CM

## 2022-03-23 DIAGNOSIS — C50.911 MALIGNANT NEOPLASM OF UNSPECIFIED SITE OF RIGHT FEMALE BREAST: ICD-10-CM

## 2022-05-05 ENCOUNTER — APPOINTMENT (OUTPATIENT)
Dept: BREAST CENTER | Facility: CLINIC | Age: 62
End: 2022-05-05
Payer: COMMERCIAL

## 2022-05-05 VITALS
WEIGHT: 125 LBS | DIASTOLIC BLOOD PRESSURE: 82 MMHG | BODY MASS INDEX: 23.6 KG/M2 | SYSTOLIC BLOOD PRESSURE: 170 MMHG | TEMPERATURE: 98.3 F | HEIGHT: 61 IN

## 2022-05-05 DIAGNOSIS — C50.919 MALIGNANT NEOPLASM OF UNSPECIFIED SITE OF UNSPECIFIED FEMALE BREAST: ICD-10-CM

## 2022-05-05 PROCEDURE — 99213 OFFICE O/P EST LOW 20 MIN: CPT

## 2022-05-05 NOTE — HISTORY OF PRESENT ILLNESS
[FreeTextEntry1] : CC: History of Right breast cancer, 2014. \par \par AURELIANO DANIELS is a 60 year year old female patient who presents for evaluation and for follow up apt.  Patient has history of right breast carcinoma treated in 2014 with lumpectomy and sentinel lymph node biopsy. Surgery was performed by  at [facility], who has since retired. After surgery, she received whole breast irradiation therapy to the right breast. Oncotype DX score was 15. She saw  of medical oncology who recommended no chemotherapy, and prescribed adjuvant ARIMIDEX. \par She completed treatment of 5 years with Arimidex on 3/20. \par \par \par Mammogram 11/23/20: No significant masses, calcifications or other findings are seen in either breast \par Mammo Bi-RADS 1 NEGATIVE \par \par Ultrasound complete bilateral 11/23/20\par No suspicious abnormalities were seen sonographically in either breast \par Ultrasound Bi-RADS 1: NEGATIVE\par \par INTERVAL HISTORY:\par 05/05/2022 --\par AURELIANO DANIELS is a 61 year year old female patient who presents for evaluation and for follow up apt.  \par \par Her most recent imaging is as follows:\par B/L Screening Mammo & Sono - 11/29/2021:\par -There are scattered areas of fibroglandular density.\par -There is a stable mild benign scarring in the upper outer quadrant right breast, site of lumpectomy.\par -Biopsy clips are noted in the upper outer quadrant left breast\par Mammo BI-RADS 2: BENIGN\par US BREAST COMPLETE BILATERAL\par -No suspicious abnormalities were seen sonographically in either breast.\par Ultrasound BI-RADS 1: NEGATIVE\par OVERALL IMPRESSION: OVERALL BI-RADS 2: BENIGN\par

## 2022-05-05 NOTE — DATA REVIEWED
[FreeTextEntry1] : B/L Screening Mammo & Sono - 11/29/2021:\par \par Clinical Breast Exam: Patient does report clinical breast exam in the last year.\par Clinical Indication: Routine screening. Family history of mother with breast cancer. Patient has a personal history of right breast cancer, lumpectomy.\par \par Compared to: 11/23/2020, 11/21/2019, 11/19/2018, and 11/16/2017\par \par MAMMOGRAM: \par \par Tomosynthesis and 2D imaging of the breast(s) were performed.  Current study was also evaluated with a computer aided detection (CAD) system.\par \par  \par \par Breast density: There are scattered areas of fibroglandular density.\par \par No significant masses, calcifications, or other findings are seen in either breast. \par There is a stable mild benign scarring in the upper outer quadrant right breast, site of lumpectomy.\par \par Biopsy clips are noted in the upper outer quadrant left breast\par \par Mammo BI-RADS 2: BENIGN\par US BREAST COMPLETE BILATERAL\par \par Ultrasound evaluation was performed including examination of all four quadrants of the breast(s) and the retroareolar region(s).\par \par  \par \par No suspicious abnormalities were seen sonographically in either breast.\par \par Ultrasound BI-RADS 1: NEGATIVE\par \par OVERALL IMPRESSION: OVERALL BI-RADS 2: BENIGN\par There is no mammographic or sonographic evidence of malignancy.\par \par A 1 year screening mammogram and ultrasound of both breast(s) is recommended. The patient will be sent a normal letter.\par \par   \par

## 2022-05-05 NOTE — REASON FOR VISIT
[Follow-Up: _____] : a [unfilled] follow-up visit [Family Member] : family member [FreeTextEntry1] : h/o right breast cancer.

## 2022-05-05 NOTE — PHYSICAL EXAM
[Normocephalic] : normocephalic [Atraumatic] : atraumatic [No Supraclavicular Adenopathy] : no supraclavicular adenopathy [No dominant masses] : no dominant masses in right breast  [No dominant masses] : no dominant masses left breast [No Nipple Discharge] : no left nipple discharge [Breast Nipple Inversion] : nipples not inverted [Breast Nipple Retraction] : nipples not retracted [No Axillary Lymphadenopathy] : no left axillary lymphadenopathy [No Rashes] : no rashes [No Ulceration] : no ulceration [de-identified] : well healed surgical scars.

## 2022-05-05 NOTE — ASSESSMENT
[FreeTextEntry1] : 61 year old female who presents today for her annual clinical breast examination.  She has a history of right breast cancer in 2014, status post lumpectomy and SLNB with Dr. Meza. She received whole breast radiation therapy. She did not receive chemotherapy. She was on Arimidex.  She has no prior complaints related to the breasts and no prior history of breast surgery or breast biopsy.  Her family history is significant for her mother with breast cancer at 67.  \par \par \par Her most recent imaging is as follows:\par B/L Screening Mammo & Sono - 11/29/2021:\par -There are scattered areas of fibroglandular density.\par -There is a stable mild benign scarring in the upper outer quadrant right breast, site of lumpectomy.\par -Biopsy clips are noted in the upper outer quadrant left breast\par Mammo BI-RADS 2: BENIGN\par US BREAST COMPLETE BILATERAL\par -No suspicious abnormalities were seen sonographically in either breast.\par Ultrasound BI-RADS 1: NEGATIVE\par OVERALL IMPRESSION: OVERALL BI-RADS 2: BENIGN\par \par Plan:\par - B/L Screening Mammo & Sono - November 2022.\par - follow-up in one year.\par \par All of her questions were appropriately answered.\par She knows to call with any concerns.\par \par

## 2023-03-21 ENCOUNTER — APPOINTMENT (OUTPATIENT)
Dept: HEMATOLOGY ONCOLOGY | Facility: CLINIC | Age: 63
End: 2023-03-21
Payer: COMMERCIAL

## 2023-03-21 ENCOUNTER — APPOINTMENT (OUTPATIENT)
Dept: HEMATOLOGY ONCOLOGY | Facility: CLINIC | Age: 63
End: 2023-03-21

## 2023-03-21 ENCOUNTER — OUTPATIENT (OUTPATIENT)
Dept: OUTPATIENT SERVICES | Facility: HOSPITAL | Age: 63
LOS: 1 days | End: 2023-03-21
Payer: COMMERCIAL

## 2023-03-21 VITALS
TEMPERATURE: 98.4 F | DIASTOLIC BLOOD PRESSURE: 75 MMHG | HEART RATE: 63 BPM | SYSTOLIC BLOOD PRESSURE: 125 MMHG | HEIGHT: 61 IN | BODY MASS INDEX: 23.22 KG/M2 | WEIGHT: 123 LBS

## 2023-03-21 DIAGNOSIS — C50.919 MALIGNANT NEOPLASM OF UNSPECIFIED SITE OF UNSPECIFIED FEMALE BREAST: ICD-10-CM

## 2023-03-21 DIAGNOSIS — Z85.3 ENCOUNTER FOR FOLLOW-UP EXAMINATION AFTER COMPLETED TREATMENT FOR MALIGNANT NEOPLASM: ICD-10-CM

## 2023-03-21 DIAGNOSIS — Z98.890 OTHER SPECIFIED POSTPROCEDURAL STATES: Chronic | ICD-10-CM

## 2023-03-21 DIAGNOSIS — Z08 ENCOUNTER FOR FOLLOW-UP EXAMINATION AFTER COMPLETED TREATMENT FOR MALIGNANT NEOPLASM: ICD-10-CM

## 2023-03-21 PROCEDURE — 99213 OFFICE O/P EST LOW 20 MIN: CPT

## 2023-03-22 DIAGNOSIS — C50.919 MALIGNANT NEOPLASM OF UNSPECIFIED SITE OF UNSPECIFIED FEMALE BREAST: ICD-10-CM

## 2023-03-22 NOTE — RESULTS/DATA
[FreeTextEntry1] : Bilateral screening mammogram and US on 12/5/22 @ [facility] was benign.\par

## 2023-03-22 NOTE — ASSESSMENT
[FreeTextEntry1] : Patient is a 62 year old female with history of stage IA hormone receptor positive, HER2 negative infiltrating ductal carcinoma of the right breast, status post lumpectomy and radiation, currently on Arimidex without any evidence of recurrence. Benign lesions on both ovaries, s/p RA BSO 06/15/2018. Completed 5 years of Arimidex on 3/2020. \par \par Bilateral Mammogram and US on 12/5/202 was benign.\par Blood work results from PCP on 5/11/2022 reviewed.\par \par RECOMMENDATION:\par Previous notes reviewed and all relevant radiology results discussed with Dr Byers and communicated to the patient.\par \par - Will continue surveillance monitoring.\par - Next mammogram/US is due on 12/2022, patient gets her script from Breast Surgeon.\par - Continue Calcium and Vitamin D supplementation. \par - Healthy lifestyle with diet and exercise emphasized.\par - Continue to follow up with Breast Surgeon as scheduled.\par - Follow up with PCP, gyne and GI for health maintenance.\par \par Follow up in 1 year.\par \par Case was seen and discussed with Dr. Byers who agreed with assessment and plan.\par \par \par

## 2023-03-22 NOTE — PHYSICAL EXAM
[Fully active, able to carry on all pre-disease performance without restriction] : Status 0 - Fully active, able to carry on all pre-disease performance without restriction [Normal] : affect appropriate [de-identified] : Right breast without any palpable abnormalities; left breast is normal and no lymphadenopathy noted bilaterally.

## 2023-03-22 NOTE — HISTORY OF PRESENT ILLNESS
[de-identified] : The patient returns for a followup visit.  She has history of stage IA hormone receptor positive, HER2 negative infiltrating ductal carcinoma of the right breast, status post lumpectomy and radiation.  Oncotype DX score on the tumor was 15.  She was started on hormonal therapy with Arimidex in 03/2015.  She is tolerating it well.  She is very compliant with her medications.  She does not report any new symptoms.  She follow up with her gynecologist annually. Her mammogram from November 2016 was unremarkable. She also had a bilateral sonogram which was unremarkable also. Her last Dexa scan was in May 2017 and showed osteopenia. She takes calcium and vitamin D regularly.\par \par She is not interested in getting colonoscopy.\par  [de-identified] : AURELIANO DANIELS                         2    Surgical Final Report     Final Diagnosis 1. Left fallopian tube and ovary for frozen section: - Hemorrhagic cyst of ovary with mainly denuded inner surface lining and abundant hemosiderin laden macrophages, consistent with endometrioid cyst. -The remaining ovarian parenchyma showing corpus albicans and a few glandular inclusions. - Segment of fallopian tube without significant pathologic changes.  \par \par 2. Right fallopian tube and ovary for frozen section: - Hemorrhagic cyst of ovary with mainly denuded inner surface lining and abundant hemosiderin laden macrophages, consistent with endometrioid cyst. - The remaining ovarian parenchyma showing corpus albicans and a few glandular inclusions with microcalcification. -Adjacent benign simple cysts also present. - Segment of fallopian tube without significant pathological change. [de-identified] : No new complaints. Had labs on 8/7/17 which were reviewed and were normal.\par She had pelvic USG findings showed decrease in size of right ovarian cyst and decrease in size of complex left mass. Report has been reviewed with patient in detail. She denies any vaginal bleeding and has been seen by her GYN on a regular basis.\par She is compliant with her meds--\par \par 4/2/18\par Patient here for follow up, feels well has no complaints, denies any shortness of breath, chest pain, abdominal pain, nausea, vomiting, vaginal bleeding, or change in bowel or urinary habits. Patient had a 3D mammogram and US done 11/16/17 which were BIRADS-1. Patient also had a follow up US which demonstrated a mild increase in the bilobed Mild right ovarian cystic lesion\par \par 9/27/18-\par S/p b/l oophorectomy in June 2018. \par On Arimidex since 03/2015.\par Tolerating well. Denies fever, nausea, vomiting, chest pain, SOB, abdominal pain, bowel and bladder problems.\par Due for mammogram in 11/2018. -----\par On calcium and Vit D supplementation. DEXA in 05/2017 showed osteopenia. \par \par 3/28/19\par pt is here for follow up.\par No new complaints\par On Arimidex since 03/2015.\par Tolerating well. Denies fever, nausea, vomiting, chest pain, SOB, abdominal pain, bowel and bladder problems.\par \par 9/23/2019\par Patient presents for follow up.  She has no complaints.  Was found to have osteopenia on 5/2017 and 5/2019 DEXA scans and was offered Boniva but declined.  States that she walks two miles daily and takes calcium and vitamin D supplementation, so she does not believe it is necessary.  Is compli--------------------ant with arimidex and is looking forward to discontinuing it after 5 years.  Denies anorexia, weight loss, breast masses, nipple discharge, axillary LAD.  Already has appointment for mammogram in 11/2019.  Is UTD with gynecologic care but not with colonoscopy.\par ---------------------------\par AURELIANO DANIELS a 59 year F is here today for follow up for breast cancer. \par Patient denies any new palpable breast lumps or pain, denies skin changes, denies nipple discharge. Denies bleeding, bone pain. \par She completed 5 years of arimidex in 3/2020. Continues vitamin d and calcium.\par [facility] bilateral screening, 11/2019 no evidence of malignancy. ----------------------------\par \par 3/22/2022\par Patient is here to follow up for breast cancer. \par She feels great, denies any new palpable breast lumps or pain, denies skin changes, denies nipple discharge or bone pain. \par She takes Vitamin D and calcium and maintains a healthy lifestyle, walking 4 miles per day.----\par She is UTD with PCP, gyne but not colonoscopy.\par \par 3/21/23\par Patient is here to follow up for breast cancer. \par She feels well, denies any new palpable breast lumps or pain, skin changes, nipple discharge or bone pain. \par She takes Vitamin D and calcium and maintains a healthy lifestyle, walking 5 miles per day.\par She is UTD with PCP, gyne Dr Ch but not colonoscopy.

## 2023-04-03 DIAGNOSIS — Z08 ENCOUNTER FOR FOLLOW-UP EXAMINATION AFTER COMPLETED TREATMENT FOR MALIGNANT NEOPLASM: ICD-10-CM

## 2023-05-04 ENCOUNTER — APPOINTMENT (OUTPATIENT)
Dept: BREAST CENTER | Facility: CLINIC | Age: 63
End: 2023-05-04
Payer: COMMERCIAL

## 2023-05-04 VITALS
HEIGHT: 61 IN | BODY MASS INDEX: 23.41 KG/M2 | WEIGHT: 124 LBS | DIASTOLIC BLOOD PRESSURE: 68 MMHG | SYSTOLIC BLOOD PRESSURE: 118 MMHG

## 2023-05-04 PROCEDURE — 99213 OFFICE O/P EST LOW 20 MIN: CPT

## 2023-05-04 NOTE — HISTORY OF PRESENT ILLNESS
[FreeTextEntry1] : CC: History of Right breast cancer, 2014. \par \par AURELIANO DANIELS is a 60 year year old female patient who presents for evaluation and for follow up apt.  Patient has history of right breast carcinoma treated in 2014 with lumpectomy and sentinel lymph node biopsy. Surgery was performed by  at [facility], who has since retired. After surgery, she received whole breast irradiation therapy to the right breast. Oncotype DX score was 15. She saw  of medical oncology who recommended no chemotherapy, and prescribed adjuvant ARIMIDEX. \par She completed treatment of 5 years with Arimidex on 3/20. \par \par \par Mammogram 11/23/20: No significant masses, calcifications or other findings are seen in either breast \par Mammo Bi-RADS 1 NEGATIVE \par \par Ultrasound complete bilateral 11/23/20\par No suspicious abnormalities were seen sonographically in either breast \par Ultrasound Bi-RADS 1: NEGATIVE\par \par INTERVAL HISTORY:\par 05/05/2022 --\par AURELIANO DANIELS is a 61 year year old female patient who presents for evaluation and for follow up apt.  \par \par Her most recent imaging is as follows:\par B/L Screening Mammo & Sono - 11/29/2021:\par -There are scattered areas of fibroglandular density.\par -There is a stable mild benign scarring in the upper outer quadrant right breast, site of lumpectomy.\par -Biopsy clips are noted in the upper outer quadrant left breast\par Mammo BI-RADS 2: BENIGN\par US BREAST COMPLETE BILATERAL\par -No suspicious abnormalities were seen sonographically in either breast.\par Ultrasound BI-RADS 1: NEGATIVE\par OVERALL IMPRESSION: OVERALL BI-RADS 2: BENIGN\par \par 05/04/2023 --\par AURELIANO DANIELS is a 62 year old female patient who presents today in follow up for history of right breast cancer.\par Since her last visit, she has no new breast related complaints. \par \par Her most recent imaging is as follows:\par B/L Screening Mammo & Sono - 12/05/2022:\par -There are scattered areas of fibroglandular density.\par -No suspicious masses, calcifications, or other findings are seen.\par -There are biopsy clips again noted in the upper outer quadrant left breast.\par -Scarring from prior lumpectomy, in the upper outer quadrant right breast is stable. \par -There is also scarring in the right axilla as well as a surgical clip.\par Mammo BI-RADS 1: NEGATIVE\par US BREAST COMPLETE BILATERAL\par -No suspicious abnormalities were seen sonographically.\par -There is a 0.6cm ovoid simple cyst in the left breast at 3:00, 7cm from the nipple.\par Ultrasound BI-RADS 2: BENIGN\par OVERALL IMPRESSION: OVERALL BI-RADS 2: BENIGN\par There is no mammographic or sonographic evidence of malignancy.\par \par She presents today for evaluation and imaging review.\par

## 2023-05-04 NOTE — PHYSICAL EXAM
[Normocephalic] : normocephalic [Atraumatic] : atraumatic [No Supraclavicular Adenopathy] : no supraclavicular adenopathy [No dominant masses] : no dominant masses in right breast  [No dominant masses] : no dominant masses left breast [No Nipple Discharge] : no left nipple discharge [No Rashes] : no rashes [No Ulceration] : no ulceration [Breast Nipple Inversion] : nipples not inverted [Breast Nipple Retraction] : nipples not retracted [de-identified] : well healed surgical scars. [de-identified] : No axillary lymphadenopathy appreciated. [de-identified] : No axillary lymphadenopathy appreciated.

## 2023-05-04 NOTE — DATA REVIEWED
[FreeTextEntry1] : B/L Screening Mammo & Sono - 12/05/2022:\par \par MAMMOGRAM: \par \par Tomosynthesis 3D and 2D imaging of the breast(s) were performed.  Current study was also evaluated with a computer aided detection (CAD) system.\par \par Breast density: There are scattered areas of fibroglandular density.\par \par No suspicious masses, calcifications, or other findings are seen.\par There are biopsy clips again noted in the upper outer quadrant left breast.\par Scarring from prior lumpectomy, in the upper outer quadrant right breast is stable. There is also scarring in the right axilla as well as a surgical clip.\par Mammo BI-RADS 1: NEGATIVE\par \par US BREAST COMPLETE BILATERAL\par No suspicious abnormalities were seen sonographically.\par There is a 0.6cm ovoid simple cyst in the left breast at 3:00, 7cm from the nipple.\par Ultrasound BI-RADS 2: BENIGN\par \par OVERALL IMPRESSION: OVERALL BI-RADS 2: BENIGN\par There is no mammographic or sonographic evidence of malignancy.\par  \par

## 2023-05-04 NOTE — ASSESSMENT
[FreeTextEntry1] : 62 year old female who presents today for her annual clinical breast examination.  She has a history of right breast cancer in 2014, status post lumpectomy and SLNB with Dr. Meza. She received whole breast radiation therapy. She did not receive chemotherapy. She was on Arimidex.  She has no prior complaints related to the breasts and no prior history of breast surgery or breast biopsy.  Her family history is significant for her mother with breast cancer at 67.  \par \par Her most recent imaging is as follows:\par B/L Screening Mammo & Sono - 12/05/2022:\par -There are scattered areas of fibroglandular density.\par -No suspicious masses, calcifications, or other findings are seen.\par -There are biopsy clips again noted in the upper outer quadrant left breast.\par -Scarring from prior lumpectomy, in the upper outer quadrant right breast is stable. \par -There is also scarring in the right axilla as well as a surgical clip.\par Mammo BI-RADS 1: NEGATIVE\par US BREAST COMPLETE BILATERAL\par -No suspicious abnormalities were seen sonographically.\par -There is a 0.6cm ovoid simple cyst in the left breast at 3:00, 7cm from the nipple.\par Ultrasound BI-RADS 2: BENIGN\par OVERALL IMPRESSION: OVERALL BI-RADS 2: BENIGN\par There is no mammographic or sonographic evidence of malignancy.\par \par Plan:\par - B/L Screening Mammo & Sono - December 2023.\par - follow-up in one year.\par \par I spent a total of 20 minutes of face to face time with this patient, greater than 50% of which was spent in counseling and/or coordination of care.\par All of her questions were appropriately answered.\par She knows to call with any concerns.\par \par \par

## 2023-12-12 ENCOUNTER — APPOINTMENT (OUTPATIENT)
Dept: GASTROENTEROLOGY | Facility: CLINIC | Age: 63
End: 2023-12-12
Payer: COMMERCIAL

## 2023-12-12 DIAGNOSIS — Z86.79 PERSONAL HISTORY OF OTHER DISEASES OF THE CIRCULATORY SYSTEM: ICD-10-CM

## 2023-12-12 DIAGNOSIS — Z85.3 PERSONAL HISTORY OF MALIGNANT NEOPLASM OF BREAST: ICD-10-CM

## 2023-12-12 DIAGNOSIS — Z78.9 OTHER SPECIFIED HEALTH STATUS: ICD-10-CM

## 2023-12-12 DIAGNOSIS — Z80.3 FAMILY HISTORY OF MALIGNANT NEOPLASM OF BREAST: ICD-10-CM

## 2023-12-12 DIAGNOSIS — Z12.11 ENCOUNTER FOR SCREENING FOR MALIGNANT NEOPLASM OF COLON: ICD-10-CM

## 2023-12-12 DIAGNOSIS — R19.4 CHANGE IN BOWEL HABIT: ICD-10-CM

## 2023-12-12 PROCEDURE — 99204 OFFICE O/P NEW MOD 45 MIN: CPT

## 2023-12-12 RX ORDER — SODIUM SULFATE, MAGNESIUM SULFATE, AND POTASSIUM CHLORIDE 17.75; 2.7; 2.25 G/1; G/1; G/1
1479-225-188 TABLET ORAL
Qty: 24 | Refills: 0 | Status: ACTIVE | COMMUNITY
Start: 2023-12-12 | End: 1900-01-01

## 2023-12-12 RX ORDER — ANASTROZOLE TABLETS 1 MG/1
1 TABLET ORAL DAILY
Qty: 90 | Refills: 1 | Status: DISCONTINUED | COMMUNITY
End: 2023-12-12

## 2023-12-12 RX ORDER — ALPRAZOLAM 0.25 MG/1
0.25 TABLET ORAL
Refills: 0 | Status: DISCONTINUED | COMMUNITY
End: 2023-12-12

## 2023-12-12 RX ORDER — ESCITALOPRAM OXALATE 5 MG/1
5 TABLET, FILM COATED ORAL
Refills: 0 | Status: DISCONTINUED | COMMUNITY
End: 2023-12-12

## 2023-12-12 RX ORDER — CALCIUM CARBONATE/VITAMIN D3 600 MG-20
600-800 TABLET ORAL
Refills: 0 | Status: DISCONTINUED | COMMUNITY
Start: 2019-09-23 | End: 2023-12-12

## 2023-12-12 RX ORDER — LISINOPRIL 10 MG/1
10 TABLET ORAL
Refills: 0 | Status: ACTIVE | COMMUNITY

## 2023-12-22 ENCOUNTER — TRANSCRIPTION ENCOUNTER (OUTPATIENT)
Age: 63
End: 2023-12-22

## 2023-12-22 ENCOUNTER — OUTPATIENT (OUTPATIENT)
Dept: OUTPATIENT SERVICES | Facility: HOSPITAL | Age: 63
LOS: 1 days | Discharge: HOME | End: 2023-12-22
Payer: COMMERCIAL

## 2023-12-22 VITALS
TEMPERATURE: 98 F | HEIGHT: 60 IN | RESPIRATION RATE: 14 BRPM | DIASTOLIC BLOOD PRESSURE: 95 MMHG | HEART RATE: 64 BPM | WEIGHT: 121.92 LBS | SYSTOLIC BLOOD PRESSURE: 137 MMHG

## 2023-12-22 VITALS
OXYGEN SATURATION: 100 % | RESPIRATION RATE: 18 BRPM | SYSTOLIC BLOOD PRESSURE: 129 MMHG | HEART RATE: 54 BPM | DIASTOLIC BLOOD PRESSURE: 81 MMHG

## 2023-12-22 DIAGNOSIS — Z98.890 OTHER SPECIFIED POSTPROCEDURAL STATES: Chronic | ICD-10-CM

## 2023-12-22 DIAGNOSIS — R19.4 CHANGE IN BOWEL HABIT: ICD-10-CM

## 2023-12-22 DIAGNOSIS — Z12.11 ENCOUNTER FOR SCREENING FOR MALIGNANT NEOPLASM OF COLON: ICD-10-CM

## 2023-12-22 PROCEDURE — 45380 COLONOSCOPY AND BIOPSY: CPT

## 2023-12-22 PROCEDURE — 88305 TISSUE EXAM BY PATHOLOGIST: CPT

## 2023-12-22 RX ORDER — ESCITALOPRAM OXALATE 10 MG/1
1 TABLET, FILM COATED ORAL
Qty: 0 | Refills: 0 | DISCHARGE

## 2023-12-22 RX ORDER — CHOLECALCIFEROL (VITAMIN D3) 125 MCG
1 CAPSULE ORAL
Qty: 0 | Refills: 0 | DISCHARGE

## 2023-12-22 RX ORDER — ASCORBIC ACID 60 MG
1 TABLET,CHEWABLE ORAL
Qty: 0 | Refills: 0 | DISCHARGE

## 2023-12-22 RX ORDER — MULTIVIT-MIN/FERROUS GLUCONATE 9 MG/15 ML
1 LIQUID (ML) ORAL
Qty: 0 | Refills: 0 | DISCHARGE

## 2023-12-22 RX ORDER — ANASTROZOLE 1 MG/1
1 TABLET ORAL
Qty: 0 | Refills: 0 | DISCHARGE

## 2023-12-22 RX ORDER — LISINOPRIL 2.5 MG/1
1 TABLET ORAL
Refills: 0 | DISCHARGE

## 2023-12-22 RX ORDER — ALPRAZOLAM 0.25 MG
1 TABLET ORAL
Qty: 0 | Refills: 0 | DISCHARGE

## 2023-12-22 RX ORDER — IBUPROFEN 200 MG
1 TABLET ORAL
Qty: 0 | Refills: 0 | DISCHARGE

## 2023-12-22 NOTE — H&P PST ADULT - HISTORY OF PRESENT ILLNESS
63 year old female patient average risk for CRC, HTN, hx of breast cancer s/p lumpectomy and radiation therapy in 2014, comes in with few weeks hx of change in bowel habits in terms of consistency after a viral infection; never had screening colonoscopy.  here for colonoscopy

## 2023-12-22 NOTE — ASU DISCHARGE PLAN (ADULT/PEDIATRIC) - CARE PROVIDER_API CALL
Heidi Yee  Gastroenterology  [address] [place] [address]  Phone: [phone]  Fax: [phone]  Established Patient  Follow Up Time: Routine   Heidi Yee  Gastroenterology  [address] [place] [address]  Phone: [phone]  Fax: [phone]  Established Patient  Follow Up Time: Routine

## 2023-12-22 NOTE — ASU PATIENT PROFILE, ADULT - PAIN CHRONIC, PROFILE
Problem: Activity Intolerance  Goal: # Functional status is maintained or returned to baseline  Outcome: Outcome Met, Continue evaluating goal progress toward completion     Problem: At Risk for Falls  Goal: # Patient does not fall  Outcome: Outcome Met, Continue evaluating goal progress toward completion     Problem: Fluid Volume Excess, Risk for  Goal: # Absence of New Onset Dyspnea  Description: Dyspnea greater than SOB with Activity may be indicator of fluid volume excess  Outcome: Outcome Met, Continue evaluating goal progress toward completion     Problem: Fluid Volume Excess  Goal: # Oxygenation is maintained (SpO2 greater than or equal to 90% or as ordered)  Outcome: Outcome Met, Continue evaluating goal progress toward completion      no

## 2023-12-22 NOTE — H&P PST ADULT - NSICDXPASTMEDICALHX_GEN_ALL_CORE_FT
PAST MEDICAL HISTORY:  Anxiety     Breast cancer RIGHT BREAST CA, S/P LUMPECTOMY AND RADIATION. LAST DOSE 3/15    Ovarian cyst B/L

## 2023-12-22 NOTE — H&P PST ADULT - CARDIOVASCULAR
normal/regular rate and rhythm/S1 S2 present/no gallops/no rub/no murmur Statement Selected negative

## 2023-12-22 NOTE — ASU PATIENT PROFILE, ADULT - NSICDXPASTMEDICALHX_GEN_ALL_CORE_FT
PAST MEDICAL HISTORY:  Anxiety     Breast cancer RIGHT BREAST CA, S/P LUMPECTOMY AND RADIATION. LAST DOSE 3/15    HTN (hypertension)     Ovarian cyst B/L

## 2023-12-22 NOTE — ASU PATIENT PROFILE, ADULT - FALL HARM RISK - UNIVERSAL INTERVENTIONS
Bed in lowest position, wheels locked, appropriate side rails in place/Call bell, personal items and telephone in reach/Instruct patient to call for assistance before getting out of bed or chair/Non-slip footwear when patient is out of bed/[place] to call system/Physically safe environment - no spills, clutter or unnecessary equipment/Purposeful Proactive Rounding/Room/bathroom lighting operational, light cord in reach Bed in lowest position, wheels locked, appropriate side rails in place/Call bell, personal items and telephone in reach/Instruct patient to call for assistance before getting out of bed or chair/Non-slip footwear when patient is out of bed/[place] to call system/Physically safe environment - no spills, clutter or unnecessary equipment/Purposeful Proactive Rounding/Room/bathroom lighting operational, light cord in reach

## 2023-12-22 NOTE — ASU DISCHARGE PLAN (ADULT/PEDIATRIC) - PROVIDER TOKENS
PROVIDER:[TOKEN:[[id number]],FOLLOWUP:[Routine],ESTABLISHEDPATIENT:[T]] PROVIDER:[TOKEN:[[id number]],FOLLOWUP:[Routine],ESTABLISHEDPATIENT:[T]]

## 2023-12-22 NOTE — CHART NOTE - NSCHARTNOTEFT_GEN_A_CORE
PACU ANESTHESIA ADMISSION NOTE      Procedure:   Post op diagnosis:      ____  Intubated  TV:______       Rate: ______      FiO2: ______    _x___  Patent Airway    _x___  Full return of protective reflexes    _x___  Full recovery from anesthesia / back to baseline status    Vitals:  T(C): 36.8 (12-22-23 @ 12:46), Max: 36.8 (12-22-23 @ 12:46)    BP  123/67  P  65  R  14  Sat  98    Mental Status:  _x___ Awake   _____ Alert   _____ Drowsy   _____ Sedated    Nausea/Vomiting:  _x___  NO       ______Yes,   See Post - Op Orders         Pain Scale (0-10):  __0___    Treatment: _x___ None    ____ See Post - Op/PCA Orders    Post - Operative Fluids:   __x__ Oral   ____ See Post - Op Orders    Plan: Discharge:   _x___Home       _____Floor     _____Critical Care    _____  Other:_________________    Comments:  No anesthesia issues or complications noted.  Discharge when criteria met.

## 2023-12-22 NOTE — ASU DISCHARGE PLAN (ADULT/PEDIATRIC) - NS MD DC FALL RISK RISK
For information on Fall & Injury Prevention, visit: [email or web address] OR  [email or web address] OR  https://www.cdc.gov/steadi/patient.html For information on Fall & Injury Prevention, visit: [email or web address] OR  [email or web address] OR  https://www.cdc.gov/steadi/patient.html

## 2023-12-23 ENCOUNTER — RESULT REVIEW (OUTPATIENT)
Age: 63
End: 2023-12-23

## 2023-12-23 PROCEDURE — 88305 TISSUE EXAM BY PATHOLOGIST: CPT | Mod: 26

## 2023-12-27 DIAGNOSIS — Z12.11 ENCOUNTER FOR SCREENING FOR MALIGNANT NEOPLASM OF COLON: ICD-10-CM

## 2023-12-27 DIAGNOSIS — K64.8 OTHER HEMORRHOIDS: ICD-10-CM

## 2023-12-27 DIAGNOSIS — I10 ESSENTIAL (PRIMARY) HYPERTENSION: ICD-10-CM

## 2023-12-27 DIAGNOSIS — K64.4 RESIDUAL HEMORRHOIDAL SKIN TAGS: ICD-10-CM

## 2023-12-27 LAB
SURGICAL PATHOLOGY STUDY: SIGNIFICANT CHANGE UP
SURGICAL PATHOLOGY STUDY: SIGNIFICANT CHANGE UP

## 2024-02-27 PROBLEM — I10 ESSENTIAL (PRIMARY) HYPERTENSION: Chronic | Status: ACTIVE | Noted: 2023-12-22

## 2024-03-19 ENCOUNTER — APPOINTMENT (OUTPATIENT)
Dept: HEMATOLOGY ONCOLOGY | Facility: CLINIC | Age: 64
End: 2024-03-19

## 2024-04-03 ENCOUNTER — APPOINTMENT (OUTPATIENT)
Age: 64
End: 2024-04-03

## 2024-06-14 ENCOUNTER — APPOINTMENT (OUTPATIENT)
Dept: BREAST CENTER | Facility: CLINIC | Age: 64
End: 2024-06-14
Payer: COMMERCIAL

## 2024-06-14 VITALS
WEIGHT: 124 LBS | BODY MASS INDEX: 23.41 KG/M2 | HEIGHT: 61 IN | SYSTOLIC BLOOD PRESSURE: 126 MMHG | DIASTOLIC BLOOD PRESSURE: 78 MMHG

## 2024-06-14 DIAGNOSIS — C50.911 MALIGNANT NEOPLASM OF UNSPECIFIED SITE OF RIGHT FEMALE BREAST: ICD-10-CM

## 2024-06-14 PROCEDURE — 99214 OFFICE O/P EST MOD 30 MIN: CPT

## 2024-06-14 NOTE — HISTORY OF PRESENT ILLNESS
[FreeTextEntry1] : CC: History of Right breast cancer, 2014.   AURELIANO DANIELS is a 60 year year old female patient who presents for evaluation and for follow up apt.  Patient has history of right breast carcinoma treated in 2014 with lumpectomy and sentinel lymph node biopsy. Surgery was performed by  at [facility], who has since retired. After surgery, she received whole breast irradiation therapy to the right breast. Oncotype DX score was 15. She saw  of medical oncology who recommended no chemotherapy, and prescribed adjuvant ARIMIDEX.  She completed treatment of 5 years with Arimidex on 3/20.    Mammogram 11/23/20: No significant masses, calcifications or other findings are seen in either breast  Mammo Bi-RADS 1 NEGATIVE   Ultrasound complete bilateral 11/23/20 No suspicious abnormalities were seen sonographically in either breast  Ultrasound Bi-RADS 1: NEGATIVE  INTERVAL HISTORY: 05/05/2022 -- AURELIANO DANIELS is a 61 year year old female patient who presents for evaluation and for follow up apt.    Her most recent imaging is as follows: B/L Screening Mammo & Sono - 11/29/2021: -There are scattered areas of fibroglandular density. -There is a stable mild benign scarring in the upper outer quadrant right breast, site of lumpectomy. -Biopsy clips are noted in the upper outer quadrant left breast Mammo BI-RADS 2: BENIGN US BREAST COMPLETE BILATERAL -No suspicious abnormalities were seen sonographically in either breast. Ultrasound BI-RADS 1: NEGATIVE OVERALL IMPRESSION: OVERALL BI-RADS 2: BENIGN  05/04/2023 -- AURELIANO DANIELS is a 62 year old female patient who presents today in follow up for history of right breast cancer. Since her last visit, she has no new breast related complaints.   Her most recent imaging is as follows: B/L Screening Mammo & Sono - 12/05/2022: -There are scattered areas of fibroglandular density. -No suspicious masses, calcifications, or other findings are seen. -There are biopsy clips again noted in the upper outer quadrant left breast. -Scarring from prior lumpectomy, in the upper outer quadrant right breast is stable.  -There is also scarring in the right axilla as well as a surgical clip. Mammo BI-RADS 1: NEGATIVE US BREAST COMPLETE BILATERAL -No suspicious abnormalities were seen sonographically. -There is a 0.6cm ovoid simple cyst in the left breast at 3:00, 7cm from the nipple. Ultrasound BI-RADS 2: BENIGN OVERALL IMPRESSION: OVERALL BI-RADS 2: BENIGN There is no mammographic or sonographic evidence of malignancy.  She presents today for evaluation and imaging review.  06/14/2024 -- AURELIANO DANIELS is a 63-year-old female patient who presents today in follow up for history of right breast cancer. Since her last visit, she has no new breast related complaints.   Her most recent imaging is as follows: B/L Screening Mammo & Sono - 12/11/2023: -Breast density: There are scattered areas of fibroglandular density. -No suspicious masses, calcifications, or other findings are seen. US BREAST COMPLETE BILATERAL -There is a simple 5mm cyst in the left breast at 3:00 axis at 7cm from the nipple. -No suspicious abnormalities were seen sonographically. Mammo BI-RADS 1: NEGATIVE Ultrasound BI-RADS 2: BENIGN IMPRFESSION: No imaging evidence of malignancy on the current exam(s). OVERALL BI-RADS 2: BENIGN    She presents today for evaluation and imaging review.

## 2024-06-14 NOTE — ASSESSMENT
[FreeTextEntry1] : 63-year-old female who presents today for her annual clinical breast examination.  She has a history of right breast cancer in 2014, status post lumpectomy and SLNB with Dr. Meza. She received whole breast radiation therapy. She did not receive chemotherapy. She was on Arimidex.  She has no prior complaints related to the breasts and no prior history of breast surgery or breast biopsy.  Her family history is significant for her mother with breast cancer at 67.    Her most recent imaging is as follows: B/L Screening Mammo & Sono - 12/11/2023: -Breast density: There are scattered areas of fibroglandular density. -No suspicious masses, calcifications, or other findings are seen. US BREAST COMPLETE BILATERAL -There is a simple 5mm cyst in the left breast at 3:00 axis at 7cm from the nipple. -No suspicious abnormalities were seen sonographically. Mammo BI-RADS 1: NEGATIVE Ultrasound BI-RADS 2: BENIGN IMPRFESSION: No imaging evidence of malignancy on the current exam(s). OVERALL BI-RADS 2: BENIGN   Plan: - B/L Screening Mammo & Sono - December 2024. - follow-up in one year.  I spent a total of 30 minutes of face-to-face time with this patient, greater than 50% of which was spent in counseling and/or coordination of care. All of her questions were appropriately answered. She knows to call with any concerns.

## 2024-06-14 NOTE — PAST MEDICAL HISTORY
[Postmenopausal] : The patient is postmenopausal [History of Hormone Replacement Treatment] : has no history of hormone replacement treatment [Total Preg ___] : G[unfilled] [Premature ___] : Premature: [unfilled] [FreeTextEntry5] : denies [FreeTextEntry6] : denies [FreeTextEntry7] : denies [FreeTextEntry8] : denies

## 2024-06-14 NOTE — PHYSICAL EXAM
[Normocephalic] : normocephalic [Atraumatic] : atraumatic [No Supraclavicular Adenopathy] : no supraclavicular adenopathy [No dominant masses] : no dominant masses in right breast  [No dominant masses] : no dominant masses left breast [No Nipple Discharge] : no left nipple discharge [Breast Nipple Inversion] : nipples not inverted [Breast Nipple Retraction] : nipples not retracted [No Rashes] : no rashes [No Ulceration] : no ulceration [de-identified] : well healed surgical scars. [de-identified] : No axillary lymphadenopathy appreciated. [de-identified] : No axillary lymphadenopathy appreciated.

## 2024-06-14 NOTE — DATA REVIEWED
[FreeTextEntry1] : B/L Screening Mammo & Sono - 12/11/2023:  MAMMOGRAM:   Tomosynthesis 3D and 2D imaging of the breast(s) were performed.  Current study was also evaluated with a computer aided detection (CAD) system.  Breast density: There are scattered areas of fibroglandular density.  No suspicious masses, calcifications, or other findings are seen.  US BREAST COMPLETE BILATERAL There is a simple 5mm cyst in the left breast at 3:00 axis at 7cm from the nipple. No suspicious abnormalities were seen sonographically.  Mammo BI-RADS 1: NEGATIVE Ultrasound BI-RADS 2: BENIGN  IMPRFESSION: No imaging evidence of malignancy on the current exam(s).  OVERALL BI-RADS 2: BENIGN

## 2025-06-02 ENCOUNTER — NON-APPOINTMENT (OUTPATIENT)
Age: 65
End: 2025-06-02

## 2025-06-04 ENCOUNTER — APPOINTMENT (OUTPATIENT)
Dept: BREAST CENTER | Facility: CLINIC | Age: 65
End: 2025-06-04
Payer: MEDICARE

## 2025-06-04 DIAGNOSIS — C50.911 MALIGNANT NEOPLASM OF UNSPECIFIED SITE OF RIGHT FEMALE BREAST: ICD-10-CM

## 2025-06-04 PROCEDURE — 99214 OFFICE O/P EST MOD 30 MIN: CPT

## 2025-07-17 ENCOUNTER — OUTPATIENT (OUTPATIENT)
Dept: OUTPATIENT SERVICES | Facility: HOSPITAL | Age: 65
LOS: 1 days | Discharge: HOME | End: 2025-07-17
Payer: MEDICARE

## 2025-07-17 VITALS
WEIGHT: 126.99 LBS | DIASTOLIC BLOOD PRESSURE: 75 MMHG | RESPIRATION RATE: 17 BRPM | TEMPERATURE: 99 F | SYSTOLIC BLOOD PRESSURE: 122 MMHG | OXYGEN SATURATION: 100 % | HEIGHT: 60 IN | HEART RATE: 59 BPM

## 2025-07-17 VITALS — HEART RATE: 58 BPM | DIASTOLIC BLOOD PRESSURE: 71 MMHG | SYSTOLIC BLOOD PRESSURE: 124 MMHG

## 2025-07-17 DIAGNOSIS — H25.12 AGE-RELATED NUCLEAR CATARACT, LEFT EYE: ICD-10-CM

## 2025-07-17 DIAGNOSIS — Z98.890 OTHER SPECIFIED POSTPROCEDURAL STATES: Chronic | ICD-10-CM

## 2025-07-17 PROCEDURE — V2632: CPT

## 2025-07-17 PROCEDURE — 66999 UNLISTED PX ANT SEGMENT EYE: CPT

## 2025-07-17 RX ORDER — ROSUVASTATIN CALCIUM 20 MG/1
TABLET, FILM COATED ORAL
Refills: 0 | DISCHARGE

## 2025-07-17 NOTE — ASU PATIENT PROFILE, ADULT - FALL HARM RISK - HARM RISK INTERVENTIONS

## 2025-07-22 DIAGNOSIS — H26.8 OTHER SPECIFIED CATARACT: ICD-10-CM

## 2025-07-22 DIAGNOSIS — Z88.0 ALLERGY STATUS TO PENICILLIN: ICD-10-CM

## 2025-07-22 DIAGNOSIS — I10 ESSENTIAL (PRIMARY) HYPERTENSION: ICD-10-CM

## 2025-07-22 DIAGNOSIS — Z85.3 PERSONAL HISTORY OF MALIGNANT NEOPLASM OF BREAST: ICD-10-CM
